# Patient Record
Sex: FEMALE | Race: WHITE | Employment: OTHER | URBAN - NONMETROPOLITAN AREA
[De-identification: names, ages, dates, MRNs, and addresses within clinical notes are randomized per-mention and may not be internally consistent; named-entity substitution may affect disease eponyms.]

---

## 2017-01-04 ENCOUNTER — TELEPHONE (OUTPATIENT)
Dept: CARDIOLOGY | Age: 67
End: 2017-01-04

## 2017-06-06 ENCOUNTER — TELEPHONE (OUTPATIENT)
Dept: CARDIOLOGY | Age: 67
End: 2017-06-06

## 2017-06-27 ENCOUNTER — OFFICE VISIT (OUTPATIENT)
Dept: CARDIOLOGY | Age: 67
End: 2017-06-27

## 2017-06-27 VITALS
WEIGHT: 161 LBS | DIASTOLIC BLOOD PRESSURE: 70 MMHG | HEIGHT: 63 IN | SYSTOLIC BLOOD PRESSURE: 130 MMHG | BODY MASS INDEX: 28.53 KG/M2 | HEART RATE: 65 BPM

## 2017-06-27 DIAGNOSIS — Z01.818 PRE-OP EVALUATION: Primary | ICD-10-CM

## 2017-06-27 DIAGNOSIS — R00.2 INTERMITTENT PALPITATIONS: ICD-10-CM

## 2017-06-27 DIAGNOSIS — I10 ESSENTIAL HYPERTENSION: ICD-10-CM

## 2017-06-27 PROCEDURE — 3014F SCREEN MAMMO DOC REV: CPT | Performed by: INTERNAL MEDICINE

## 2017-06-27 PROCEDURE — 3017F COLORECTAL CA SCREEN DOC REV: CPT | Performed by: INTERNAL MEDICINE

## 2017-06-27 PROCEDURE — G8400 PT W/DXA NO RESULTS DOC: HCPCS | Performed by: INTERNAL MEDICINE

## 2017-06-27 PROCEDURE — G8419 CALC BMI OUT NRM PARAM NOF/U: HCPCS | Performed by: INTERNAL MEDICINE

## 2017-06-27 PROCEDURE — G8428 CUR MEDS NOT DOCUMENT: HCPCS | Performed by: INTERNAL MEDICINE

## 2017-06-27 PROCEDURE — 1090F PRES/ABSN URINE INCON ASSESS: CPT | Performed by: INTERNAL MEDICINE

## 2017-06-27 PROCEDURE — 4040F PNEUMOC VAC/ADMIN/RCVD: CPT | Performed by: INTERNAL MEDICINE

## 2017-06-27 PROCEDURE — 1123F ACP DISCUSS/DSCN MKR DOCD: CPT | Performed by: INTERNAL MEDICINE

## 2017-06-27 PROCEDURE — 99213 OFFICE O/P EST LOW 20 MIN: CPT | Performed by: INTERNAL MEDICINE

## 2017-06-27 PROCEDURE — 93000 ELECTROCARDIOGRAM COMPLETE: CPT | Performed by: INTERNAL MEDICINE

## 2017-06-27 PROCEDURE — 1036F TOBACCO NON-USER: CPT | Performed by: INTERNAL MEDICINE

## 2017-07-18 ENCOUNTER — HOSPITAL ENCOUNTER (OUTPATIENT)
Dept: GENERAL RADIOLOGY | Age: 67
Discharge: HOME | End: 2017-07-18
Payer: MEDICARE

## 2017-07-18 ENCOUNTER — HOSPITAL ENCOUNTER (OUTPATIENT)
Age: 67
Discharge: HOME | End: 2017-07-18
Payer: MEDICARE

## 2017-07-18 DIAGNOSIS — Z01.811 PRE-OP CHEST EXAM: ICD-10-CM

## 2017-07-18 LAB
ANION GAP SERPL CALCULATED.3IONS-SCNC: 13 MEQ/L (ref 8–16)
APTT: 27 SECONDS (ref 22–38)
BACTERIA: ABNORMAL
BASOPHILS # BLD: 1.8 %
BASOPHILS ABSOLUTE: 0.1 THOU/MM3 (ref 0–0.1)
BILIRUBIN URINE: NEGATIVE
BLOOD, URINE: NEGATIVE
BUN BLDV-MCNC: 22 MG/DL (ref 7–22)
CASTS: ABNORMAL /LPF
CASTS: ABNORMAL /LPF
CHARACTER, URINE: CLEAR
CHLORIDE BLD-SCNC: 102 MEQ/L (ref 98–111)
CO2: 26 MEQ/L (ref 23–33)
COLOR: YELLOW
CREAT SERPL-MCNC: 1 MG/DL (ref 0.4–1.2)
CRYSTALS: ABNORMAL
EOSINOPHIL # BLD: 3.9 %
EOSINOPHILS ABSOLUTE: 0.2 THOU/MM3 (ref 0–0.4)
EPITHELIAL CELLS, UA: ABNORMAL /HPF
GFR SERPL CREATININE-BSD FRML MDRD: 55 ML/MIN/1.73M2
GLUCOSE BLD-MCNC: 91 MG/DL (ref 70–108)
GLUCOSE, URINE: NEGATIVE MG/DL
HCT VFR BLD CALC: 38.3 % (ref 37–47)
HEMOGLOBIN: 13 GM/DL (ref 12–16)
INR BLD: 0.91 (ref 0.85–1.13)
KETONES, URINE: NEGATIVE
LEUKOCYTE EST, POC: ABNORMAL
LYMPHOCYTES # BLD: 24.5 %
LYMPHOCYTES ABSOLUTE: 1.5 THOU/MM3 (ref 1–4.8)
MCH RBC QN AUTO: 33.4 PG (ref 27–31)
MCHC RBC AUTO-ENTMCNC: 33.8 GM/DL (ref 33–37)
MCV RBC AUTO: 98.6 FL (ref 81–99)
MISCELLANEOUS LAB TEST RESULT: ABNORMAL
MONOCYTES # BLD: 7.3 %
MONOCYTES ABSOLUTE: 0.5 THOU/MM3 (ref 0.4–1.3)
NITRITE, URINE: NEGATIVE
NUCLEATED RED BLOOD CELLS: 0 /100 WBC
PDW BLD-RTO: 13.3 % (ref 11.5–14.5)
PH UA: 6.5
PLATELET # BLD: 228 THOU/MM3 (ref 130–400)
PLATELET ESTIMATE: ADEQUATE
PMV BLD AUTO: 10.8 MCM (ref 7.4–10.4)
POTASSIUM SERPL-SCNC: 4.2 MEQ/L (ref 3.5–5.2)
PROTEIN UA: NEGATIVE MG/DL
RBC # BLD: 3.88 MILL/MM3 (ref 4.2–5.4)
RBC # BLD: NORMAL 10*6/UL
RBC URINE: ABNORMAL /HPF
RENAL EPITHELIAL, UA: ABNORMAL
SCAN OF BLOOD SMEAR: NORMAL
SEG NEUTROPHILS: 62.5 %
SEGMENTED NEUTROPHILS ABSOLUTE COUNT: 3.9 THOU/MM3 (ref 1.8–7.7)
SODIUM BLD-SCNC: 141 MEQ/L (ref 135–145)
SPECIFIC GRAVITY UA: 1.02 (ref 1–1.03)
UROBILINOGEN, URINE: 0.2 EU/DL
WBC # BLD: 6.2 THOU/MM3 (ref 4.8–10.8)
WBC UA: ABNORMAL /HPF
YEAST: ABNORMAL

## 2017-07-18 PROCEDURE — 82565 ASSAY OF CREATININE: CPT

## 2017-07-18 PROCEDURE — 81001 URINALYSIS AUTO W/SCOPE: CPT

## 2017-07-18 PROCEDURE — 85730 THROMBOPLASTIN TIME PARTIAL: CPT

## 2017-07-18 PROCEDURE — 71020 XR CHEST STANDARD TWO VW: CPT

## 2017-07-18 PROCEDURE — 85610 PROTHROMBIN TIME: CPT

## 2017-07-18 PROCEDURE — 84520 ASSAY OF UREA NITROGEN: CPT

## 2017-07-18 PROCEDURE — 36415 COLL VENOUS BLD VENIPUNCTURE: CPT

## 2017-07-18 PROCEDURE — 85025 COMPLETE CBC W/AUTO DIFF WBC: CPT

## 2017-07-18 PROCEDURE — 82947 ASSAY GLUCOSE BLOOD QUANT: CPT

## 2017-07-18 PROCEDURE — 80051 ELECTROLYTE PANEL: CPT

## 2017-08-12 ENCOUNTER — APPOINTMENT (OUTPATIENT)
Dept: CT IMAGING | Age: 67
End: 2017-08-12
Payer: MEDICARE

## 2017-08-12 ENCOUNTER — HOSPITAL ENCOUNTER (EMERGENCY)
Age: 67
Discharge: HOME | End: 2017-08-12
Attending: INTERNAL MEDICINE
Payer: MEDICARE

## 2017-08-12 VITALS
HEIGHT: 63 IN | RESPIRATION RATE: 14 BRPM | TEMPERATURE: 98.2 F | WEIGHT: 160 LBS | DIASTOLIC BLOOD PRESSURE: 66 MMHG | HEART RATE: 73 BPM | SYSTOLIC BLOOD PRESSURE: 145 MMHG | BODY MASS INDEX: 28.35 KG/M2 | OXYGEN SATURATION: 98 %

## 2017-08-12 DIAGNOSIS — R11.0 NAUSEA: Primary | ICD-10-CM

## 2017-08-12 LAB
ALBUMIN SERPL-MCNC: 3.5 G/DL (ref 3.5–5.1)
ALP BLD-CCNC: 92 U/L (ref 38–126)
ALT SERPL-CCNC: 12 U/L (ref 11–66)
AMYLASE: 95 U/L (ref 20–104)
ANION GAP SERPL CALCULATED.3IONS-SCNC: 15 MEQ/L (ref 8–16)
AST SERPL-CCNC: 14 U/L (ref 5–40)
BASOPHILS # BLD: 0.8 %
BASOPHILS ABSOLUTE: 0.1 THOU/MM3 (ref 0–0.1)
BILIRUB SERPL-MCNC: 0.2 MG/DL (ref 0.3–1.2)
BILIRUBIN DIRECT: < 0.2 MG/DL (ref 0–0.3)
BILIRUBIN URINE: NEGATIVE
BLOOD, URINE: NEGATIVE
BUN BLDV-MCNC: 16 MG/DL (ref 7–22)
CALCIUM SERPL-MCNC: 9.5 MG/DL (ref 8.5–10.5)
CHARACTER, URINE: CLEAR
CHLORIDE BLD-SCNC: 97 MEQ/L (ref 98–111)
CO2: 25 MEQ/L (ref 23–33)
COLOR: YELLOW
CREAT SERPL-MCNC: 0.8 MG/DL (ref 0.4–1.2)
EOSINOPHIL # BLD: 0.7 %
EOSINOPHILS ABSOLUTE: 0.1 THOU/MM3 (ref 0–0.4)
GFR SERPL CREATININE-BSD FRML MDRD: 71 ML/MIN/1.73M2
GLUCOSE BLD-MCNC: 146 MG/DL (ref 70–108)
GLUCOSE, URINE: NEGATIVE MG/DL
HCT VFR BLD CALC: 31.6 % (ref 37–47)
HEMOGLOBIN: 10.6 GM/DL (ref 12–16)
KETONES, URINE: NEGATIVE
LEUKOCYTE EST, POC: NEGATIVE
LIPASE: 39.8 U/L (ref 5.6–51.3)
LYMPHOCYTES # BLD: 16.8 %
LYMPHOCYTES ABSOLUTE: 1.4 THOU/MM3 (ref 1–4.8)
MAGNESIUM: 1.8 MG/DL (ref 1.6–2.4)
MCH RBC QN AUTO: 31.8 PG (ref 27–31)
MCHC RBC AUTO-ENTMCNC: 33.7 GM/DL (ref 33–37)
MCV RBC AUTO: 94.5 FL (ref 81–99)
MONOCYTES # BLD: 4.8 %
MONOCYTES ABSOLUTE: 0.4 THOU/MM3 (ref 0.4–1.3)
NITRITE, URINE: NEGATIVE
NUCLEATED RED BLOOD CELLS: 0 /100 WBC
OSMOLALITY CALCULATION: 277.6 MOSMOL/KG (ref 275–300)
PDW BLD-RTO: 12.7 % (ref 11.5–14.5)
PH UA: 7
PLATELET # BLD: 531 THOU/MM3 (ref 130–400)
PMV BLD AUTO: 8.5 MCM (ref 7.4–10.4)
POTASSIUM SERPL-SCNC: 3.8 MEQ/L (ref 3.5–5.2)
PROTEIN UA: NEGATIVE MG/DL
RBC # BLD: 3.35 MILL/MM3 (ref 4.2–5.4)
RBC # BLD: NORMAL 10*6/UL
SEG NEUTROPHILS: 76.9 %
SEGMENTED NEUTROPHILS ABSOLUTE COUNT: 6.5 THOU/MM3 (ref 1.8–7.7)
SODIUM BLD-SCNC: 137 MEQ/L (ref 135–145)
SPECIFIC GRAVITY UA: 1.01 (ref 1–1.03)
TOTAL PROTEIN: 6.7 G/DL (ref 6.1–8)
UROBILINOGEN, URINE: 0.2 EU/DL
WBC # BLD: 8.5 THOU/MM3 (ref 4.8–10.8)

## 2017-08-12 PROCEDURE — 87086 URINE CULTURE/COLONY COUNT: CPT

## 2017-08-12 PROCEDURE — 2580000003 HC RX 258: Performed by: INTERNAL MEDICINE

## 2017-08-12 PROCEDURE — 36415 COLL VENOUS BLD VENIPUNCTURE: CPT

## 2017-08-12 PROCEDURE — 80053 COMPREHEN METABOLIC PANEL: CPT

## 2017-08-12 PROCEDURE — 81003 URINALYSIS AUTO W/O SCOPE: CPT

## 2017-08-12 PROCEDURE — 83690 ASSAY OF LIPASE: CPT

## 2017-08-12 PROCEDURE — 82248 BILIRUBIN DIRECT: CPT

## 2017-08-12 PROCEDURE — 96372 THER/PROPH/DIAG INJ SC/IM: CPT

## 2017-08-12 PROCEDURE — 82150 ASSAY OF AMYLASE: CPT

## 2017-08-12 PROCEDURE — 85025 COMPLETE CBC W/AUTO DIFF WBC: CPT

## 2017-08-12 PROCEDURE — 96360 HYDRATION IV INFUSION INIT: CPT

## 2017-08-12 PROCEDURE — 83735 ASSAY OF MAGNESIUM: CPT

## 2017-08-12 PROCEDURE — 74176 CT ABD & PELVIS W/O CONTRAST: CPT

## 2017-08-12 PROCEDURE — 99284 EMERGENCY DEPT VISIT MOD MDM: CPT

## 2017-08-12 PROCEDURE — 6360000002 HC RX W HCPCS: Performed by: INTERNAL MEDICINE

## 2017-08-12 RX ORDER — 0.9 % SODIUM CHLORIDE 0.9 %
1000 INTRAVENOUS SOLUTION INTRAVENOUS ONCE
Status: COMPLETED | OUTPATIENT
Start: 2017-08-12 | End: 2017-08-12

## 2017-08-12 RX ORDER — ONDANSETRON 2 MG/ML
4 INJECTION INTRAMUSCULAR; INTRAVENOUS ONCE
Status: DISCONTINUED | OUTPATIENT
Start: 2017-08-12 | End: 2017-08-12 | Stop reason: HOSPADM

## 2017-08-12 RX ADMIN — TRIMETHOBENZAMIDE HYDROCHLORIDE 200 MG: 100 INJECTION INTRAMUSCULAR at 16:13

## 2017-08-12 RX ADMIN — SODIUM CHLORIDE 1000 ML: 9 INJECTION, SOLUTION INTRAVENOUS at 13:56

## 2017-08-12 ASSESSMENT — ENCOUNTER SYMPTOMS
ABDOMINAL PAIN: 0
COUGH: 0
BACK PAIN: 1
NAUSEA: 1
SHORTNESS OF BREATH: 0
EYE PAIN: 0
RHINORRHEA: 0
SORE THROAT: 0
DIARRHEA: 1
WHEEZING: 0
VOMITING: 1
EYE DISCHARGE: 0

## 2017-08-12 ASSESSMENT — PAIN DESCRIPTION - LOCATION: LOCATION: BACK

## 2017-08-12 ASSESSMENT — PAIN DESCRIPTION - DESCRIPTORS: DESCRIPTORS: ACHING

## 2017-08-12 ASSESSMENT — PAIN SCALES - WONG BAKER: WONGBAKER_NUMERICALRESPONSE: 2

## 2017-08-12 ASSESSMENT — PAIN DESCRIPTION - PAIN TYPE: TYPE: ACUTE PAIN

## 2017-08-14 LAB — URINE CULTURE, ROUTINE: NORMAL

## 2017-10-16 ENCOUNTER — HOSPITAL ENCOUNTER (OUTPATIENT)
Age: 67
Discharge: HOME | End: 2017-10-16
Payer: MEDICARE

## 2017-10-16 LAB
ALBUMIN SERPL-MCNC: 3.8 G/DL (ref 3.5–5.1)
ALT SERPL-CCNC: 16 U/L (ref 11–66)
BASOPHILS # BLD: 1 %
BASOPHILS ABSOLUTE: 0.1 THOU/MM3 (ref 0–0.1)
CREAT SERPL-MCNC: 0.9 MG/DL (ref 0.4–1.2)
EOSINOPHIL # BLD: 2.6 %
EOSINOPHILS ABSOLUTE: 0.2 THOU/MM3 (ref 0–0.4)
GFR SERPL CREATININE-BSD FRML MDRD: 62 ML/MIN/1.73M2
HCT VFR BLD CALC: 40.6 % (ref 37–47)
HEMOGLOBIN: 13.8 GM/DL (ref 12–16)
LYMPHOCYTES # BLD: 24.2 %
LYMPHOCYTES ABSOLUTE: 1.4 THOU/MM3 (ref 1–4.8)
MCH RBC QN AUTO: 31.6 PG (ref 27–31)
MCHC RBC AUTO-ENTMCNC: 34.1 GM/DL (ref 33–37)
MCV RBC AUTO: 92.6 FL (ref 81–99)
MONOCYTES # BLD: 6.4 %
MONOCYTES ABSOLUTE: 0.4 THOU/MM3 (ref 0.4–1.3)
NUCLEATED RED BLOOD CELLS: 0 /100 WBC
PDW BLD-RTO: 13.5 % (ref 11.5–14.5)
PLATELET # BLD: 271 THOU/MM3 (ref 130–400)
PMV BLD AUTO: 9 MCM (ref 7.4–10.4)
RBC # BLD: 4.38 MILL/MM3 (ref 4.2–5.4)
RBC # BLD: NORMAL 10*6/UL
SEDIMENTATION RATE, ERYTHROCYTE: 12 MM/HR (ref 0–20)
SEG NEUTROPHILS: 65.8 %
SEGMENTED NEUTROPHILS ABSOLUTE COUNT: 3.9 THOU/MM3 (ref 1.8–7.7)
WBC # BLD: 5.9 THOU/MM3 (ref 4.8–10.8)

## 2017-10-16 PROCEDURE — 84460 ALANINE AMINO (ALT) (SGPT): CPT

## 2017-10-16 PROCEDURE — 82565 ASSAY OF CREATININE: CPT

## 2017-10-16 PROCEDURE — 85651 RBC SED RATE NONAUTOMATED: CPT

## 2017-10-16 PROCEDURE — 82040 ASSAY OF SERUM ALBUMIN: CPT

## 2017-10-16 PROCEDURE — 36415 COLL VENOUS BLD VENIPUNCTURE: CPT

## 2017-10-16 PROCEDURE — 85025 COMPLETE CBC W/AUTO DIFF WBC: CPT

## 2018-02-14 ENCOUNTER — HOSPITAL ENCOUNTER (OUTPATIENT)
Age: 68
Discharge: HOME | End: 2018-02-14
Payer: MEDICARE

## 2018-02-14 LAB
ALBUMIN SERPL-MCNC: 4.1 G/DL (ref 3.5–5.1)
ALT SERPL-CCNC: 16 U/L (ref 11–66)
ANISOCYTOSIS: ABNORMAL
BASOPHILS # BLD: 0.6 %
BASOPHILS ABSOLUTE: 0 THOU/MM3 (ref 0–0.1)
CREAT SERPL-MCNC: 0.8 MG/DL (ref 0.4–1.2)
EOSINOPHIL # BLD: 3 %
EOSINOPHILS ABSOLUTE: 0.2 THOU/MM3 (ref 0–0.4)
GFR SERPL CREATININE-BSD FRML MDRD: 71 ML/MIN/1.73M2
HCT VFR BLD CALC: 38.3 % (ref 37–47)
HEMOGLOBIN: 12.8 GM/DL (ref 12–16)
LYMPHOCYTES # BLD: 24.5 %
LYMPHOCYTES ABSOLUTE: 1.3 THOU/MM3 (ref 1–4.8)
MCH RBC QN AUTO: 32.7 PG (ref 27–31)
MCHC RBC AUTO-ENTMCNC: 33.3 GM/DL (ref 33–37)
MCV RBC AUTO: 98.1 FL (ref 81–99)
MONOCYTES # BLD: 10.2 %
MONOCYTES ABSOLUTE: 0.5 THOU/MM3 (ref 0.4–1.3)
NUCLEATED RED BLOOD CELLS: 0 /100 WBC
PDW BLD-RTO: 14.8 % (ref 11.5–14.5)
PLATELET # BLD: 264 THOU/MM3 (ref 130–400)
PMV BLD AUTO: 9.2 FL (ref 7.4–10.4)
RBC # BLD: 3.9 MILL/MM3 (ref 4.2–5.4)
SEDIMENTATION RATE, ERYTHROCYTE: 11 MM/HR (ref 0–20)
SEG NEUTROPHILS: 61.7 %
SEGMENTED NEUTROPHILS ABSOLUTE COUNT: 3.2 THOU/MM3 (ref 1.8–7.7)
WBC # BLD: 5.2 THOU/MM3 (ref 4.8–10.8)

## 2018-02-14 PROCEDURE — 82565 ASSAY OF CREATININE: CPT

## 2018-02-14 PROCEDURE — 85025 COMPLETE CBC W/AUTO DIFF WBC: CPT

## 2018-02-14 PROCEDURE — 85651 RBC SED RATE NONAUTOMATED: CPT

## 2018-02-14 PROCEDURE — 36415 COLL VENOUS BLD VENIPUNCTURE: CPT

## 2018-02-14 PROCEDURE — 84460 ALANINE AMINO (ALT) (SGPT): CPT

## 2018-02-14 PROCEDURE — 82040 ASSAY OF SERUM ALBUMIN: CPT

## 2018-04-16 ENCOUNTER — HOSPITAL ENCOUNTER (OUTPATIENT)
Dept: MRI IMAGING | Age: 68
Discharge: HOME | End: 2018-04-16
Payer: MEDICARE

## 2018-04-16 DIAGNOSIS — M48.062 LUMBAR STENOSIS WITH NEUROGENIC CLAUDICATION: ICD-10-CM

## 2018-04-16 DIAGNOSIS — M96.0 PSEUDARTHROSIS FOLLOWING SPINAL FUSION: ICD-10-CM

## 2018-04-16 PROCEDURE — 73221 MRI JOINT UPR EXTREM W/O DYE: CPT

## 2018-06-02 ENCOUNTER — HOSPITAL ENCOUNTER (OUTPATIENT)
Age: 68
Discharge: HOME | End: 2018-06-02
Payer: MEDICARE

## 2018-06-02 LAB
ANISOCYTOSIS: ABNORMAL
BACTERIA: ABNORMAL /HPF
BASOPHILS # BLD: 0.8 %
BASOPHILS ABSOLUTE: 0.1 THOU/MM3 (ref 0–0.1)
BILIRUBIN URINE: NEGATIVE
BLOOD, URINE: NEGATIVE
CASTS 2: ABNORMAL /LPF
CASTS UA: ABNORMAL /LPF
CHARACTER, URINE: CLEAR
COLOR: YELLOW
CRYSTALS, UA: ABNORMAL
EOSINOPHIL # BLD: 3.5 %
EOSINOPHILS ABSOLUTE: 0.3 THOU/MM3 (ref 0–0.4)
EPITHELIAL CELLS, UA: ABNORMAL /HPF
GLUCOSE URINE: NEGATIVE MG/DL
HCT VFR BLD CALC: 40.4 % (ref 37–47)
HEMOGLOBIN: 13.6 GM/DL (ref 12–16)
KETONES, URINE: NEGATIVE
LEUKOCYTE ESTERASE, URINE: ABNORMAL
LYMPHOCYTES # BLD: 31.4 %
LYMPHOCYTES ABSOLUTE: 2.3 THOU/MM3 (ref 1–4.8)
MCH RBC QN AUTO: 32.8 PG (ref 27–31)
MCHC RBC AUTO-ENTMCNC: 33.8 GM/DL (ref 33–37)
MCV RBC AUTO: 97.1 FL (ref 81–99)
MISCELLANEOUS 2: ABNORMAL
MONOCYTES # BLD: 6.2 %
MONOCYTES ABSOLUTE: 0.5 THOU/MM3 (ref 0.4–1.3)
NITRITE, URINE: NEGATIVE
NUCLEATED RED BLOOD CELLS: 0 /100 WBC
PDW BLD-RTO: 14.7 % (ref 11.5–14.5)
PH UA: 6.5
PLATELET # BLD: 307 THOU/MM3 (ref 130–400)
PMV BLD AUTO: 10.5 FL (ref 7.4–10.4)
PROTEIN UA: NEGATIVE
RBC # BLD: 4.16 MILL/MM3 (ref 4.2–5.4)
RBC URINE: ABNORMAL /HPF
RENAL EPITHELIAL, UA: ABNORMAL
SEG NEUTROPHILS: 58.1 %
SEGMENTED NEUTROPHILS ABSOLUTE COUNT: 4.3 THOU/MM3 (ref 1.8–7.7)
SPECIFIC GRAVITY, URINE: 1.01 (ref 1–1.03)
UROBILINOGEN, URINE: 0.2 EU/DL
WBC # BLD: 7.4 THOU/MM3 (ref 4.8–10.8)
WBC UA: ABNORMAL /HPF
YEAST: ABNORMAL

## 2018-06-02 PROCEDURE — 36415 COLL VENOUS BLD VENIPUNCTURE: CPT

## 2018-06-02 PROCEDURE — 81001 URINALYSIS AUTO W/SCOPE: CPT

## 2018-06-02 PROCEDURE — 85025 COMPLETE CBC W/AUTO DIFF WBC: CPT

## 2018-06-02 PROCEDURE — 87086 URINE CULTURE/COLONY COUNT: CPT

## 2018-06-04 ENCOUNTER — OFFICE VISIT (OUTPATIENT)
Dept: CARDIOLOGY CLINIC | Age: 68
End: 2018-06-04
Payer: MEDICARE

## 2018-06-04 VITALS
BODY MASS INDEX: 28.95 KG/M2 | HEIGHT: 63 IN | HEART RATE: 60 BPM | WEIGHT: 163.38 LBS | DIASTOLIC BLOOD PRESSURE: 82 MMHG | SYSTOLIC BLOOD PRESSURE: 148 MMHG

## 2018-06-04 DIAGNOSIS — Z82.49 FAMILY HISTORY OF PREMATURE CAD: ICD-10-CM

## 2018-06-04 DIAGNOSIS — Z01.818 PRE-OP EVALUATION: Primary | ICD-10-CM

## 2018-06-04 DIAGNOSIS — Z72.0 TOBACCO ABUSE: ICD-10-CM

## 2018-06-04 DIAGNOSIS — I10 ESSENTIAL HYPERTENSION: ICD-10-CM

## 2018-06-04 DIAGNOSIS — R00.2 INTERMITTENT PALPITATIONS: ICD-10-CM

## 2018-06-04 DIAGNOSIS — R94.31 ABNORMAL EKG: ICD-10-CM

## 2018-06-04 LAB — URINE CULTURE REFLEX: NORMAL

## 2018-06-04 PROCEDURE — G8427 DOCREV CUR MEDS BY ELIG CLIN: HCPCS | Performed by: INTERNAL MEDICINE

## 2018-06-04 PROCEDURE — 4040F PNEUMOC VAC/ADMIN/RCVD: CPT | Performed by: INTERNAL MEDICINE

## 2018-06-04 PROCEDURE — G8400 PT W/DXA NO RESULTS DOC: HCPCS | Performed by: INTERNAL MEDICINE

## 2018-06-04 PROCEDURE — 3017F COLORECTAL CA SCREEN DOC REV: CPT | Performed by: INTERNAL MEDICINE

## 2018-06-04 PROCEDURE — 1036F TOBACCO NON-USER: CPT | Performed by: INTERNAL MEDICINE

## 2018-06-04 PROCEDURE — 1090F PRES/ABSN URINE INCON ASSESS: CPT | Performed by: INTERNAL MEDICINE

## 2018-06-04 PROCEDURE — G8417 CALC BMI ABV UP PARAM F/U: HCPCS | Performed by: INTERNAL MEDICINE

## 2018-06-04 PROCEDURE — 99214 OFFICE O/P EST MOD 30 MIN: CPT | Performed by: INTERNAL MEDICINE

## 2018-06-04 PROCEDURE — 1123F ACP DISCUSS/DSCN MKR DOCD: CPT | Performed by: INTERNAL MEDICINE

## 2018-06-04 RX ORDER — DESVENLAFAXINE 100 MG/1
TABLET, EXTENDED RELEASE ORAL DAILY
Status: ON HOLD | COMMUNITY
End: 2021-10-22

## 2018-06-04 RX ORDER — AMOXICILLIN AND CLAVULANATE POTASSIUM 875; 125 MG/1; MG/1
1 TABLET, FILM COATED ORAL EVERY 12 HOURS
COMMUNITY
End: 2019-01-21

## 2018-06-06 ENCOUNTER — TELEPHONE (OUTPATIENT)
Dept: CARDIOLOGY CLINIC | Age: 68
End: 2018-06-06

## 2018-06-11 ENCOUNTER — HOSPITAL ENCOUNTER (OUTPATIENT)
Age: 68
Discharge: HOME | End: 2018-06-11
Payer: MEDICARE

## 2018-06-11 LAB
ALBUMIN SERPL-MCNC: 4 G/DL (ref 3.5–5.1)
ALT SERPL-CCNC: 17 U/L (ref 11–66)
BASOPHILS # BLD: 0.7 %
BASOPHILS ABSOLUTE: 0.1 THOU/MM3 (ref 0–0.1)
CREAT SERPL-MCNC: 1.1 MG/DL (ref 0.4–1.2)
EOSINOPHIL # BLD: 2.6 %
EOSINOPHILS ABSOLUTE: 0.2 THOU/MM3 (ref 0–0.4)
GFR SERPL CREATININE-BSD FRML MDRD: 49 ML/MIN/1.73M2
HCT VFR BLD CALC: 38.2 % (ref 37–47)
HEMOGLOBIN: 12.8 GM/DL (ref 12–16)
LYMPHOCYTES # BLD: 21.7 %
LYMPHOCYTES ABSOLUTE: 1.7 THOU/MM3 (ref 1–4.8)
MCH RBC QN AUTO: 32.5 PG (ref 27–31)
MCHC RBC AUTO-ENTMCNC: 33.6 GM/DL (ref 33–37)
MCV RBC AUTO: 96.7 FL (ref 81–99)
MONOCYTES # BLD: 6.4 %
MONOCYTES ABSOLUTE: 0.5 THOU/MM3 (ref 0.4–1.3)
NUCLEATED RED BLOOD CELLS: 0 /100 WBC
PDW BLD-RTO: 14.1 % (ref 11.5–14.5)
PLATELET # BLD: 268 THOU/MM3 (ref 130–400)
PMV BLD AUTO: 9.1 FL (ref 7.4–10.4)
RBC # BLD: 3.95 MILL/MM3 (ref 4.2–5.4)
SEDIMENTATION RATE, ERYTHROCYTE: 13 MM/HR (ref 0–20)
SEG NEUTROPHILS: 68.6 %
SEGMENTED NEUTROPHILS ABSOLUTE COUNT: 5.4 THOU/MM3 (ref 1.8–7.7)
WBC # BLD: 7.9 THOU/MM3 (ref 4.8–10.8)

## 2018-06-11 PROCEDURE — 84460 ALANINE AMINO (ALT) (SGPT): CPT

## 2018-06-11 PROCEDURE — 82040 ASSAY OF SERUM ALBUMIN: CPT

## 2018-06-11 PROCEDURE — 82565 ASSAY OF CREATININE: CPT

## 2018-06-11 PROCEDURE — 85025 COMPLETE CBC W/AUTO DIFF WBC: CPT

## 2018-06-11 PROCEDURE — 85651 RBC SED RATE NONAUTOMATED: CPT

## 2018-06-11 PROCEDURE — 36415 COLL VENOUS BLD VENIPUNCTURE: CPT

## 2018-06-21 ENCOUNTER — HOSPITAL ENCOUNTER (OUTPATIENT)
Dept: NON INVASIVE DIAGNOSTICS | Age: 68
Discharge: HOME | End: 2018-06-21
Payer: MEDICARE

## 2018-06-21 DIAGNOSIS — Z82.49 FAMILY HISTORY OF PREMATURE CAD: ICD-10-CM

## 2018-06-21 DIAGNOSIS — Z01.818 PRE-OP EVALUATION: ICD-10-CM

## 2018-06-21 DIAGNOSIS — Z72.0 TOBACCO ABUSE: ICD-10-CM

## 2018-06-21 DIAGNOSIS — R00.2 INTERMITTENT PALPITATIONS: ICD-10-CM

## 2018-06-21 DIAGNOSIS — R94.31 ABNORMAL EKG: ICD-10-CM

## 2018-06-21 DIAGNOSIS — I10 ESSENTIAL HYPERTENSION: ICD-10-CM

## 2018-06-21 LAB
LV EF: 65 %
LVEF MODALITY: NORMAL

## 2018-06-21 PROCEDURE — 78452 HT MUSCLE IMAGE SPECT MULT: CPT

## 2018-06-21 PROCEDURE — A9500 TC99M SESTAMIBI: HCPCS | Performed by: INTERNAL MEDICINE

## 2018-06-21 PROCEDURE — 3430000000 HC RX DIAGNOSTIC RADIOPHARMACEUTICAL: Performed by: INTERNAL MEDICINE

## 2018-06-21 PROCEDURE — 93306 TTE W/DOPPLER COMPLETE: CPT

## 2018-06-21 PROCEDURE — 6360000002 HC RX W HCPCS

## 2018-06-21 PROCEDURE — 93017 CV STRESS TEST TRACING ONLY: CPT | Performed by: INTERNAL MEDICINE

## 2018-06-21 RX ADMIN — Medication 9.5 MILLICURIE: at 14:10

## 2018-06-21 RX ADMIN — Medication 34.2 MILLICURIE: at 14:55

## 2018-07-04 PROBLEM — Z01.818 PRE-OP EVALUATION: Status: RESOLVED | Noted: 2018-06-04 | Resolved: 2018-07-04

## 2018-08-20 ENCOUNTER — HOSPITAL ENCOUNTER (OUTPATIENT)
Age: 68
Discharge: HOME | End: 2018-08-20
Payer: MEDICARE

## 2018-08-20 LAB
ALBUMIN SERPL-MCNC: 4.3 G/DL (ref 3.5–5.1)
ALT SERPL-CCNC: 30 U/L (ref 11–66)
BASOPHILS # BLD: 0.8 %
BASOPHILS ABSOLUTE: 0 THOU/MM3 (ref 0–0.1)
CREAT SERPL-MCNC: 1 MG/DL (ref 0.4–1.2)
EOSINOPHIL # BLD: 4.5 %
EOSINOPHILS ABSOLUTE: 0.2 THOU/MM3 (ref 0–0.4)
ERYTHROCYTE [DISTWIDTH] IN BLOOD BY AUTOMATED COUNT: 13.6 % (ref 11.5–14.5)
ERYTHROCYTE [DISTWIDTH] IN BLOOD BY AUTOMATED COUNT: 46.9 FL (ref 35–45)
GFR SERPL CREATININE-BSD FRML MDRD: 55 ML/MIN/1.73M2
HCT VFR BLD CALC: 40.5 % (ref 37–47)
HEMOGLOBIN: 13.4 GM/DL (ref 12–16)
IMMATURE GRANS (ABS): 0.02 THOU/MM3 (ref 0–0.07)
IMMATURE GRANULOCYTES: 0.4 %
LYMPHOCYTES # BLD: 29.3 %
LYMPHOCYTES ABSOLUTE: 1.4 THOU/MM3 (ref 1–4.8)
MCH RBC QN AUTO: 32.5 PG (ref 26–33)
MCHC RBC AUTO-ENTMCNC: 33.1 GM/DL (ref 32.2–35.5)
MCV RBC AUTO: 98.3 FL (ref 81–99)
MONOCYTES # BLD: 9.6 %
MONOCYTES ABSOLUTE: 0.5 THOU/MM3 (ref 0.4–1.3)
NUCLEATED RED BLOOD CELLS: 0 /100 WBC
PLATELET # BLD: 244 THOU/MM3 (ref 130–400)
PMV BLD AUTO: 11 FL (ref 9.4–12.4)
RBC # BLD: 4.12 MILL/MM3 (ref 4.2–5.4)
SEDIMENTATION RATE, ERYTHROCYTE: 11 MM/HR (ref 0–20)
SEG NEUTROPHILS: 55.4 %
SEGMENTED NEUTROPHILS ABSOLUTE COUNT: 2.7 THOU/MM3 (ref 1.8–7.7)
VITAMIN D 25-HYDROXY: 45 NG/ML (ref 30–100)
WBC # BLD: 4.9 THOU/MM3 (ref 4.8–10.8)

## 2018-08-20 PROCEDURE — 82565 ASSAY OF CREATININE: CPT

## 2018-08-20 PROCEDURE — 36415 COLL VENOUS BLD VENIPUNCTURE: CPT

## 2018-08-20 PROCEDURE — 86160 COMPLEMENT ANTIGEN: CPT

## 2018-08-20 PROCEDURE — 85025 COMPLETE CBC W/AUTO DIFF WBC: CPT

## 2018-08-20 PROCEDURE — 82040 ASSAY OF SERUM ALBUMIN: CPT

## 2018-08-20 PROCEDURE — 84460 ALANINE AMINO (ALT) (SGPT): CPT

## 2018-08-20 PROCEDURE — 82306 VITAMIN D 25 HYDROXY: CPT

## 2018-08-20 PROCEDURE — 85651 RBC SED RATE NONAUTOMATED: CPT

## 2018-08-20 PROCEDURE — 86162 COMPLEMENT TOTAL (CH50): CPT

## 2018-08-22 LAB
C3 COMPLEMENT: 127 MG/DL (ref 88–201)
C4 COMPLEMENT: 25 MG/DL (ref 10–40)

## 2018-08-23 LAB — COMPLEMENT TOTAL (CH50): 96 CAE UNITS (ref 60–144)

## 2018-09-05 NOTE — TELEPHONE ENCOUNTER
Dearl Melissa called requesting a refill on the following medications:  Requested Prescriptions     Pending Prescriptions Disp Refills    metoprolol tartrate (LOPRESSOR) 25 MG tablet 90 tablet 1     Pharmacy verified:  .pv    PATIENT IS COMPLETELY OUT OF MEDICATION    Date of last visit: 6/4/18  Date of next visit (if applicable): 6/5/19

## 2018-10-01 ENCOUNTER — HOSPITAL ENCOUNTER (EMERGENCY)
Age: 68
Discharge: HOME | End: 2018-10-01
Payer: MEDICARE

## 2018-10-01 VITALS
HEIGHT: 63 IN | OXYGEN SATURATION: 97 % | WEIGHT: 163 LBS | SYSTOLIC BLOOD PRESSURE: 181 MMHG | RESPIRATION RATE: 16 BRPM | HEART RATE: 80 BPM | DIASTOLIC BLOOD PRESSURE: 103 MMHG | BODY MASS INDEX: 28.88 KG/M2
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
## 2018-10-01 PROCEDURE — 6370000000 HC RX 637 (ALT 250 FOR IP): Performed by: NURSE PRACTITIONER

## 2018-10-01 PROCEDURE — 99281 EMR DPT VST MAYX REQ PHY/QHP: CPT

## 2018-10-01 RX ORDER — FAMOTIDINE 20 MG/1
20 TABLET, FILM COATED ORAL ONCE
Status: COMPLETED | OUTPATIENT
Start: 2018-10-01 | End: 2018-10-01

## 2018-10-01 RX ORDER — CEPHALEXIN 500 MG/1
500 CAPSULE ORAL 3 TIMES DAILY
Qty: 21 CAPSULE | Refills: 0 | Status: SHIPPED | OUTPATIENT
Start: 2018-10-01 | End: 2018-10-08

## 2018-10-01 RX ADMIN — FAMOTIDINE 20 MG: 20 TABLET ORAL at 01:54

## 2018-10-01 ASSESSMENT — PAIN DESCRIPTION - LOCATION: LOCATION: ARM

## 2018-10-01 ASSESSMENT — ENCOUNTER SYMPTOMS
NAUSEA: 0
WHEEZING: 0
COUGH: 0
BACK PAIN: 0
EYE PAIN: 0
RHINORRHEA: 0
SHORTNESS OF BREATH: 0
SORE THROAT: 0
ABDOMINAL PAIN: 0
VOMITING: 0
DIARRHEA: 0
EYE DISCHARGE: 0

## 2018-10-01 ASSESSMENT — PAIN DESCRIPTION - ORIENTATION: ORIENTATION: RIGHT;INNER

## 2018-10-01 ASSESSMENT — PAIN DESCRIPTION - PROGRESSION: CLINICAL_PROGRESSION: GRADUALLY WORSENING

## 2018-10-01 ASSESSMENT — PAIN SCALES - GENERAL: PAINLEVEL_OUTOF10: 5

## 2018-10-01 ASSESSMENT — PAIN DESCRIPTION - PAIN TYPE: TYPE: ACUTE PAIN

## 2018-10-01 ASSESSMENT — PAIN DESCRIPTION - ONSET: ONSET: SUDDEN

## 2018-10-01 ASSESSMENT — PAIN DESCRIPTION - FREQUENCY: FREQUENCY: CONTINUOUS

## 2018-10-01 ASSESSMENT — PAIN DESCRIPTION - DESCRIPTORS: DESCRIPTORS: BURNING

## 2018-10-01 NOTE — ED TRIAGE NOTES
Pt reports to ED with CC of insect bite to the medial aspect of her right antecubital. Pt states she was at a cookout when she felt a bite on her arm. Pt did not see what bit her but she immediately got a headache and felt nauseas. Pt states the bite has gotten warmer and more puffed up. Pt states she wanted to come in and get it checked because its still burning. VS and assessment completed on arrival. Will continue to monitor.

## 2018-10-01 NOTE — ED PROVIDER NOTES
97%   Weight: 163 lb (73.9 kg)   Height: 5' 3\" (1.6 m)       1:40 AM: The patient was seen and evaluated. MDM:  The patient was seen and evaluated within the ED today for the evaluation of an insect bite. The patient presented in no acute distress. Physical exam revealed two small puncture lesions which presents with surrounding tenderness secondary to palpation, moderate warmth, minimal swelling, and a minimal amount of surrounding erythema to the right AC region. The patient was treated with pepcid while in the ED. I observed the patient's condition to remain stable during the duration of her stay. She was amenable to my decision for discharge and was sent home in stable condition with Keflex. I discussed return precautions and she verbalized understanding. I recommended that she f/u with her pcp in 3-5 days for further evaluation and work up if their symptoms do not improve. All questions and concerns were addressed. CRITICAL CARE:   None     CONSULTS:  None    PROCEDURES:  None    FINAL IMPRESSION      1. Insect bite, initial encounter          DISPOSITION/PLAN   Discharged    PATIENT REFERRED TO:  Ike Villela MD  [address] [place] [address]  [phone]    In 2 days        DISCHARGE MEDICATIONS:  Discharge Medication List as of 10/1/2018  1:43 AM      START taking these medications    Details   cephALEXin (KEFLEX) 500 MG capsule Take 1 capsule by mouth 3 times daily for 7 days, Disp-21 capsule, R-0Print             (Please note that portions of this note were completed with a voice recognition program.  Efforts were made to edit the dictations but occasionally words are mis-transcribed.)    The patient was given an opportunity to see the Emergency Attending. The patient voiced understanding that I was a Mid-Level Provider and was in agreement with being seen independently by myself.      Scribe:  Andrzej Vasquez 10/1/18 1:40 AM Scribing for and in the presence of Jorge Thomas

## 2018-10-12 ENCOUNTER — HOSPITAL ENCOUNTER (OUTPATIENT)
Dept: MRI IMAGING | Age: 68
Discharge: HOME | End: 2018-10-12
Payer: MEDICARE

## 2018-10-12 DIAGNOSIS — M54.5 LOW BACK PAIN, UNSPECIFIED BACK PAIN LATERALITY, UNSPECIFIED CHRONICITY, WITH SCIATICA PRESENCE UNSPECIFIED: ICD-10-CM

## 2018-10-12 DIAGNOSIS — Z87.39 H/O DEGENERATIVE DISC DISEASE: ICD-10-CM

## 2018-10-12 PROCEDURE — 72148 MRI LUMBAR SPINE W/O DYE: CPT

## 2019-01-07 ENCOUNTER — HOSPITAL ENCOUNTER (OUTPATIENT)
Age: 69
Discharge: HOME | End: 2019-01-07
Payer: MEDICARE

## 2019-01-07 LAB
ALBUMIN SERPL-MCNC: 4.2 G/DL (ref 3.5–5.1)
ALT SERPL-CCNC: 22 U/L (ref 11–66)
ANION GAP SERPL CALCULATED.3IONS-SCNC: 10 MEQ/L (ref 8–16)
APTT: 30.7 SECONDS (ref 22–38)
BASOPHILS # BLD: 1 %
BASOPHILS ABSOLUTE: 0.1 THOU/MM3 (ref 0–0.1)
BUN BLDV-MCNC: 35 MG/DL (ref 7–22)
CHLORIDE BLD-SCNC: 99 MEQ/L (ref 98–111)
CO2: 25 MEQ/L (ref 23–33)
CREAT SERPL-MCNC: 0.9 MG/DL (ref 0.4–1.2)
EOSINOPHIL # BLD: 2.3 %
EOSINOPHILS ABSOLUTE: 0.1 THOU/MM3 (ref 0–0.4)
ERYTHROCYTE [DISTWIDTH] IN BLOOD BY AUTOMATED COUNT: 12.7 % (ref 11.5–14.5)
ERYTHROCYTE [DISTWIDTH] IN BLOOD BY AUTOMATED COUNT: 44.7 FL (ref 35–45)
GFR SERPL CREATININE-BSD FRML MDRD: 62 ML/MIN/1.73M2
GLUCOSE BLD-MCNC: 100 MG/DL (ref 70–108)
HCT VFR BLD CALC: 40.4 % (ref 37–47)
HEMOGLOBIN: 13.4 GM/DL (ref 12–16)
IMMATURE GRANS (ABS): 0.01 THOU/MM3 (ref 0–0.07)
IMMATURE GRANULOCYTES: 0.2 %
INR BLD: 0.85 (ref 0.85–1.13)
LYMPHOCYTES # BLD: 25.4 %
LYMPHOCYTES ABSOLUTE: 1.6 THOU/MM3 (ref 1–4.8)
MCH RBC QN AUTO: 32.1 PG (ref 26–33)
MCHC RBC AUTO-ENTMCNC: 33.2 GM/DL (ref 32.2–35.5)
MCV RBC AUTO: 96.9 FL (ref 81–99)
MONOCYTES # BLD: 7.9 %
MONOCYTES ABSOLUTE: 0.5 THOU/MM3 (ref 0.4–1.3)
NUCLEATED RED BLOOD CELLS: 0 /100 WBC
PLATELET # BLD: 282 THOU/MM3 (ref 130–400)
PMV BLD AUTO: 10.7 FL (ref 9.4–12.4)
POTASSIUM SERPL-SCNC: 4.8 MEQ/L (ref 3.5–5.2)
RBC # BLD: 4.17 MILL/MM3 (ref 4.2–5.4)
SEDIMENTATION RATE, ERYTHROCYTE: 6 MM/HR (ref 0–20)
SEG NEUTROPHILS: 63.2 %
SEGMENTED NEUTROPHILS ABSOLUTE COUNT: 3.9 THOU/MM3 (ref 1.8–7.7)
SODIUM BLD-SCNC: 134 MEQ/L (ref 135–145)
WBC # BLD: 6.2 THOU/MM3 (ref 4.8–10.8)

## 2019-01-07 PROCEDURE — 84520 ASSAY OF UREA NITROGEN: CPT

## 2019-01-07 PROCEDURE — 82565 ASSAY OF CREATININE: CPT

## 2019-01-07 PROCEDURE — 36415 COLL VENOUS BLD VENIPUNCTURE: CPT

## 2019-01-07 PROCEDURE — 85730 THROMBOPLASTIN TIME PARTIAL: CPT

## 2019-01-07 PROCEDURE — 85025 COMPLETE CBC W/AUTO DIFF WBC: CPT

## 2019-01-07 PROCEDURE — 85651 RBC SED RATE NONAUTOMATED: CPT

## 2019-01-07 PROCEDURE — 84460 ALANINE AMINO (ALT) (SGPT): CPT

## 2019-01-07 PROCEDURE — 82040 ASSAY OF SERUM ALBUMIN: CPT

## 2019-01-07 PROCEDURE — 80051 ELECTROLYTE PANEL: CPT

## 2019-01-07 PROCEDURE — 82947 ASSAY GLUCOSE BLOOD QUANT: CPT

## 2019-01-07 PROCEDURE — 85610 PROTHROMBIN TIME: CPT

## 2019-01-16 ENCOUNTER — TELEPHONE (OUTPATIENT)
Dept: CARDIOLOGY CLINIC | Age: 69
End: 2019-01-16

## 2019-01-21 ENCOUNTER — OFFICE VISIT (OUTPATIENT)
Dept: CARDIOLOGY CLINIC | Age: 69
End: 2019-01-21
Payer: MEDICARE

## 2019-01-21 VITALS
HEART RATE: 64 BPM | SYSTOLIC BLOOD PRESSURE: 158 MMHG | WEIGHT: 170 LBS | DIASTOLIC BLOOD PRESSURE: 80 MMHG | BODY MASS INDEX: 30.11 KG/M2

## 2019-01-21 DIAGNOSIS — Z01.810 PREOP CARDIOVASCULAR EXAM: Primary | ICD-10-CM

## 2019-01-21 PROCEDURE — 1123F ACP DISCUSS/DSCN MKR DOCD: CPT | Performed by: INTERNAL MEDICINE

## 2019-01-21 PROCEDURE — G8427 DOCREV CUR MEDS BY ELIG CLIN: HCPCS | Performed by: INTERNAL MEDICINE

## 2019-01-21 PROCEDURE — G8417 CALC BMI ABV UP PARAM F/U: HCPCS | Performed by: INTERNAL MEDICINE

## 2019-01-21 PROCEDURE — 1036F TOBACCO NON-USER: CPT | Performed by: INTERNAL MEDICINE

## 2019-01-21 PROCEDURE — G8400 PT W/DXA NO RESULTS DOC: HCPCS | Performed by: INTERNAL MEDICINE

## 2019-01-21 PROCEDURE — 1090F PRES/ABSN URINE INCON ASSESS: CPT | Performed by: INTERNAL MEDICINE

## 2019-01-21 PROCEDURE — 93000 ELECTROCARDIOGRAM COMPLETE: CPT | Performed by: INTERNAL MEDICINE

## 2019-01-21 PROCEDURE — 1101F PT FALLS ASSESS-DOCD LE1/YR: CPT | Performed by: INTERNAL MEDICINE

## 2019-01-21 PROCEDURE — 4040F PNEUMOC VAC/ADMIN/RCVD: CPT | Performed by: INTERNAL MEDICINE

## 2019-01-21 PROCEDURE — 3017F COLORECTAL CA SCREEN DOC REV: CPT | Performed by: INTERNAL MEDICINE

## 2019-01-21 PROCEDURE — G8484 FLU IMMUNIZE NO ADMIN: HCPCS | Performed by: INTERNAL MEDICINE

## 2019-01-21 PROCEDURE — 99213 OFFICE O/P EST LOW 20 MIN: CPT | Performed by: INTERNAL MEDICINE

## 2019-01-21 RX ORDER — FLUTICASONE PROPIONATE 0.05 %
CREAM (GRAM) TOPICAL 2 TIMES DAILY PRN
COMMUNITY

## 2019-01-21 RX ORDER — LISINOPRIL 20 MG/1
30 TABLET ORAL DAILY
Qty: 90 TABLET | Refills: 3 | Status: SHIPPED | OUTPATIENT
Start: 2019-01-21 | End: 2020-06-03 | Stop reason: SDUPTHER

## 2019-01-21 RX ORDER — CLOBETASOL PROPIONATE 0.5 MG/G
CREAM TOPICAL DAILY PRN
COMMUNITY

## 2019-01-21 ASSESSMENT — ENCOUNTER SYMPTOMS
SHORTNESS OF BREATH: 1
HOARSE VOICE: 0
EYE PAIN: 0
EYE DISCHARGE: 0
HEMOPTYSIS: 0
BLOATING: 0
BACK PAIN: 0
DIARRHEA: 0
ABDOMINAL PAIN: 0
BLURRED VISION: 0
DOUBLE VISION: 0
CONSTIPATION: 0
COUGH: 0
ORTHOPNEA: 0
SPUTUM PRODUCTION: 0
BOWEL INCONTINENCE: 0
CHANGE IN BOWEL HABIT: 0

## 2019-04-16 ENCOUNTER — NURSE ONLY (OUTPATIENT)
Dept: LAB | Age: 69
End: 2019-04-16

## 2019-04-16 LAB
ALBUMIN SERPL-MCNC: 3.9 G/DL (ref 3.5–5.1)
ALT SERPL-CCNC: 16 U/L (ref 11–66)
BASOPHILS # BLD: 0.9 %
BASOPHILS ABSOLUTE: 0 THOU/MM3 (ref 0–0.1)
CREAT SERPL-MCNC: 1 MG/DL (ref 0.4–1.2)
EOSINOPHIL # BLD: 3.8 %
EOSINOPHILS ABSOLUTE: 0.2 THOU/MM3 (ref 0–0.4)
ERYTHROCYTE [DISTWIDTH] IN BLOOD BY AUTOMATED COUNT: 13.6 % (ref 11.5–14.5)
ERYTHROCYTE [DISTWIDTH] IN BLOOD BY AUTOMATED COUNT: 47.9 FL (ref 35–45)
GFR SERPL CREATININE-BSD FRML MDRD: 55 ML/MIN/1.73M2
HCT VFR BLD CALC: 38.6 % (ref 37–47)
HEMOGLOBIN: 12.7 GM/DL (ref 12–16)
IMMATURE GRANS (ABS): 0.01 THOU/MM3 (ref 0–0.07)
IMMATURE GRANULOCYTES: 0.2 %
LYMPHOCYTES # BLD: 32.4 %
LYMPHOCYTES ABSOLUTE: 1.5 THOU/MM3 (ref 1–4.8)
MCH RBC QN AUTO: 32 PG (ref 26–33)
MCHC RBC AUTO-ENTMCNC: 32.9 GM/DL (ref 32.2–35.5)
MCV RBC AUTO: 97.2 FL (ref 81–99)
MONOCYTES # BLD: 9.8 %
MONOCYTES ABSOLUTE: 0.4 THOU/MM3 (ref 0.4–1.3)
NUCLEATED RED BLOOD CELLS: 0 /100 WBC
PLATELET # BLD: 237 THOU/MM3 (ref 130–400)
PMV BLD AUTO: 11.2 FL (ref 9.4–12.4)
RBC # BLD: 3.97 MILL/MM3 (ref 4.2–5.4)
SEDIMENTATION RATE, ERYTHROCYTE: 13 MM/HR (ref 0–20)
SEG NEUTROPHILS: 52.9 %
SEGMENTED NEUTROPHILS ABSOLUTE COUNT: 2.4 THOU/MM3 (ref 1.8–7.7)
WBC # BLD: 4.5 THOU/MM3 (ref 4.8–10.8)

## 2019-06-25 ENCOUNTER — TELEPHONE (OUTPATIENT)
Dept: CARDIOLOGY CLINIC | Age: 69
End: 2019-06-25

## 2019-08-05 ENCOUNTER — NURSE ONLY (OUTPATIENT)
Dept: LAB | Age: 69
End: 2019-08-05

## 2019-08-05 LAB
ALBUMIN SERPL-MCNC: 4.3 G/DL (ref 3.5–5.1)
ALT SERPL-CCNC: 15 U/L (ref 11–66)
BASOPHILS # BLD: 1 %
BASOPHILS ABSOLUTE: 0.1 THOU/MM3 (ref 0–0.1)
CREAT SERPL-MCNC: 1.1 MG/DL (ref 0.4–1.2)
EOSINOPHIL # BLD: 2.7 %
EOSINOPHILS ABSOLUTE: 0.2 THOU/MM3 (ref 0–0.4)
ERYTHROCYTE [DISTWIDTH] IN BLOOD BY AUTOMATED COUNT: 13.3 % (ref 11.5–14.5)
ERYTHROCYTE [DISTWIDTH] IN BLOOD BY AUTOMATED COUNT: 50.5 FL (ref 35–45)
GFR SERPL CREATININE-BSD FRML MDRD: 49 ML/MIN/1.73M2
HCT VFR BLD CALC: 41.4 % (ref 37–47)
HEMOGLOBIN: 13.3 GM/DL (ref 12–16)
IMMATURE GRANS (ABS): 0.02 THOU/MM3 (ref 0–0.07)
IMMATURE GRANULOCYTES: 0 %
LYMPHOCYTES # BLD: 21.4 %
LYMPHOCYTES ABSOLUTE: 1.8 THOU/MM3 (ref 1–4.8)
MCH RBC QN AUTO: 33 PG (ref 26–33)
MCHC RBC AUTO-ENTMCNC: 32.1 GM/DL (ref 32.2–35.5)
MCV RBC AUTO: 102.7 FL (ref 81–99)
MONOCYTES # BLD: 10 %
MONOCYTES ABSOLUTE: 0.8 THOU/MM3 (ref 0.4–1.3)
NUCLEATED RED BLOOD CELLS: 0 /100 WBC
PLATELET # BLD: 261 THOU/MM3 (ref 130–400)
PMV BLD AUTO: 11.9 FL (ref 9.4–12.4)
RBC # BLD: 4.03 MILL/MM3 (ref 4.2–5.4)
SEDIMENTATION RATE, ERYTHROCYTE: 8 MM/HR (ref 0–20)
SEG NEUTROPHILS: 64.7 %
SEGMENTED NEUTROPHILS ABSOLUTE COUNT: 5.4 THOU/MM3 (ref 1.8–7.7)
WBC # BLD: 8.4 THOU/MM3 (ref 4.8–10.8)

## 2019-08-15 RX ORDER — LISINOPRIL 20 MG/1
30 TABLET ORAL DAILY
Qty: 90 TABLET | Refills: 3 | OUTPATIENT
Start: 2019-08-15

## 2019-08-19 ENCOUNTER — NURSE ONLY (OUTPATIENT)
Dept: LAB | Age: 69
End: 2019-08-19

## 2019-08-19 LAB
CALCIUM SERPL-MCNC: 10.1 MG/DL (ref 8.5–10.5)
MAGNESIUM: 1.9 MG/DL (ref 1.6–2.4)
POTASSIUM SERPL-SCNC: 4.7 MEQ/L (ref 3.5–5.2)
VITAMIN D 25-HYDROXY: 75 NG/ML (ref 30–100)

## 2019-09-17 ENCOUNTER — OFFICE VISIT (OUTPATIENT)
Dept: CARDIOLOGY CLINIC | Age: 69
End: 2019-09-17
Payer: MEDICARE

## 2019-09-17 VITALS
SYSTOLIC BLOOD PRESSURE: 150 MMHG | BODY MASS INDEX: 30.36 KG/M2 | HEIGHT: 62 IN | DIASTOLIC BLOOD PRESSURE: 102 MMHG | HEART RATE: 72 BPM | WEIGHT: 165 LBS

## 2019-09-17 DIAGNOSIS — F41.9 ANXIETY: ICD-10-CM

## 2019-09-17 DIAGNOSIS — L93.0 DISCOID LUPUS ERYTHEMATOSUS: ICD-10-CM

## 2019-09-17 DIAGNOSIS — I10 ESSENTIAL HYPERTENSION: ICD-10-CM

## 2019-09-17 DIAGNOSIS — R00.2 HEART PALPITATIONS: Primary | ICD-10-CM

## 2019-09-17 PROCEDURE — 93000 ELECTROCARDIOGRAM COMPLETE: CPT | Performed by: NURSE PRACTITIONER

## 2019-09-17 PROCEDURE — 1090F PRES/ABSN URINE INCON ASSESS: CPT | Performed by: NURSE PRACTITIONER

## 2019-09-17 PROCEDURE — G8400 PT W/DXA NO RESULTS DOC: HCPCS | Performed by: NURSE PRACTITIONER

## 2019-09-17 PROCEDURE — 4040F PNEUMOC VAC/ADMIN/RCVD: CPT | Performed by: NURSE PRACTITIONER

## 2019-09-17 PROCEDURE — 1036F TOBACCO NON-USER: CPT | Performed by: NURSE PRACTITIONER

## 2019-09-17 PROCEDURE — 1123F ACP DISCUSS/DSCN MKR DOCD: CPT | Performed by: NURSE PRACTITIONER

## 2019-09-17 PROCEDURE — 99213 OFFICE O/P EST LOW 20 MIN: CPT | Performed by: NURSE PRACTITIONER

## 2019-09-17 PROCEDURE — G8417 CALC BMI ABV UP PARAM F/U: HCPCS | Performed by: NURSE PRACTITIONER

## 2019-09-17 PROCEDURE — 3017F COLORECTAL CA SCREEN DOC REV: CPT | Performed by: NURSE PRACTITIONER

## 2019-09-17 PROCEDURE — G8427 DOCREV CUR MEDS BY ELIG CLIN: HCPCS | Performed by: NURSE PRACTITIONER

## 2019-09-17 RX ORDER — GABAPENTIN 300 MG/1
300 CAPSULE ORAL 3 TIMES DAILY
COMMUNITY

## 2019-09-17 RX ORDER — AMITRIPTYLINE HYDROCHLORIDE 10 MG/1
10 TABLET, FILM COATED ORAL NIGHTLY
COMMUNITY

## 2019-09-17 NOTE — PROGRESS NOTES
weight 165 lb (74.8 kg). General:   Well developed, well nourished  Lungs:   Clear to auscultation  Heart:    Normal S1 S2, No murmur, rubs, or gallops  Abdomen:   Soft, non tender, no organomegalies, positive bowel sounds  Extremities:   No edema, no cyanosis, good peripheral pulses  Neurological:   Awake, alert, oriented. No obvious focal deficits  Musculoskeletal:  No obvious deformities    EKG: SR, nonspecific T wave abnormality, no acute abnormalities    Stress Test: 6/21/19  Summary   No ischemic EKG changes.   Nonspecific ST-T wave changes.   The nuclear images is not suggestive for myocardial ischemia.      Signatures      ----------------------------------------------------------------   Electronically signed by Renzo Leger MD (Interpreting   Cardiologist) on 06/21/2018 at 16:40    Echo: 6/21/18  Summary   Left ventricle size is normal.  Qasim Greer increased left ventricle wall thickness.   Mild concentric left ventricular hypertrophy.   Systolic function was normal.   There were no regional wall motion abnormalities.   Ejection fraction is visually estimated at 65%.   Doppler parameters were consistent with abnormal left ventricular   relaxation (grade 1 diastolic dysfunction).      Signature      ----------------------------------------------------------------   Electronically signed by Renzo Leger MD (Interpreting   physician) on 06/23/2018 at 06:43 PM       Diagnosis Orders   1. Heart palpitations  EKG 12 Lead   2. Essential hypertension     3. Anxiety     4. Discoid lupus erythematosus         Orders Placed This Encounter   Procedures    EKG 12 Lead     Order Specific Question:   Reason for Exam?     Answer: Other   Stable cardiac wise. No chest pain, no sob. Infrequent palpitations. Has higher BP at home at times. She will monitor her BP for 2 weeks as instructed and bring to office for review. If needed, adjustments to medications can be made.  Her BP may be higher as she has not had any

## 2019-12-05 ENCOUNTER — HOSPITAL ENCOUNTER (INPATIENT)
Age: 69
LOS: 1 days | Discharge: HOME | DRG: 287 | End: 2019-12-06
Attending: EMERGENCY MEDICINE | Admitting: INTERNAL MEDICINE
Payer: MEDICARE

## 2019-12-05 ENCOUNTER — APPOINTMENT (OUTPATIENT)
Dept: GENERAL RADIOLOGY | Age: 69
DRG: 287 | End: 2019-12-05
Payer: MEDICARE

## 2019-12-05 DIAGNOSIS — R07.9 CHEST PAIN, UNSPECIFIED TYPE: Primary | ICD-10-CM

## 2019-12-05 PROBLEM — I21.4 NSTEMI (NON-ST ELEVATED MYOCARDIAL INFARCTION) (HCC): Status: ACTIVE | Noted: 2019-12-05

## 2019-12-05 LAB
ALBUMIN SERPL-MCNC: 3.9 G/DL (ref 3.5–5.1)
ALP BLD-CCNC: 58 U/L (ref 38–126)
ALT SERPL-CCNC: 16 U/L (ref 11–66)
AMPHETAMINE+METHAMPHETAMINE URINE SCREEN: NEGATIVE
ANION GAP SERPL CALCULATED.3IONS-SCNC: 14 MEQ/L (ref 8–16)
APTT: 144.7 SECONDS (ref 22–38)
APTT: 29.3 SECONDS (ref 22–38)
APTT: 29.8 SECONDS (ref 22–38)
AST SERPL-CCNC: 21 U/L (ref 5–40)
BACTERIA: ABNORMAL /HPF
BARBITURATE QUANTITATIVE URINE: NEGATIVE
BASOPHILS # BLD: 0.7 %
BASOPHILS ABSOLUTE: 0 THOU/MM3 (ref 0–0.1)
BENZODIAZEPINE QUANTITATIVE URINE: POSITIVE
BILIRUB SERPL-MCNC: 0.3 MG/DL (ref 0.3–1.2)
BILIRUBIN DIRECT: < 0.2 MG/DL (ref 0–0.3)
BILIRUBIN URINE: NEGATIVE
BLOOD, URINE: NEGATIVE
BUN BLDV-MCNC: 21 MG/DL (ref 7–22)
CALCIUM SERPL-MCNC: 9.5 MG/DL (ref 8.5–10.5)
CANNABINOID QUANTITATIVE URINE: NEGATIVE
CASTS 2: ABNORMAL /LPF
CASTS UA: ABNORMAL /LPF
CHARACTER, URINE: CLEAR
CHLORIDE BLD-SCNC: 102 MEQ/L (ref 98–111)
CO2: 24 MEQ/L (ref 23–33)
COCAINE METABOLITE QUANTITATIVE URINE: NEGATIVE
COLOR: YELLOW
CREAT SERPL-MCNC: 0.9 MG/DL (ref 0.4–1.2)
CRYSTALS, UA: ABNORMAL
EKG ATRIAL RATE: 75 BPM
EKG ATRIAL RATE: 90 BPM
EKG ATRIAL RATE: 96 BPM
EKG P AXIS: 46 DEGREES
EKG P AXIS: 49 DEGREES
EKG P AXIS: 49 DEGREES
EKG P-R INTERVAL: 154 MS
EKG P-R INTERVAL: 160 MS
EKG P-R INTERVAL: 162 MS
EKG Q-T INTERVAL: 332 MS
EKG Q-T INTERVAL: 390 MS
EKG Q-T INTERVAL: 408 MS
EKG QRS DURATION: 76 MS
EKG QTC CALCULATION (BAZETT): 419 MS
EKG QTC CALCULATION (BAZETT): 455 MS
EKG QTC CALCULATION (BAZETT): 477 MS
EKG R AXIS: -3 DEGREES
EKG R AXIS: 14 DEGREES
EKG R AXIS: 3 DEGREES
EKG T AXIS: 167 DEGREES
EKG T AXIS: 169 DEGREES
EKG T AXIS: 171 DEGREES
EKG VENTRICULAR RATE: 75 BPM
EKG VENTRICULAR RATE: 90 BPM
EKG VENTRICULAR RATE: 96 BPM
EOSINOPHIL # BLD: 3 %
EOSINOPHILS ABSOLUTE: 0.2 THOU/MM3 (ref 0–0.4)
EPITHELIAL CELLS, UA: ABNORMAL /HPF
ERYTHROCYTE [DISTWIDTH] IN BLOOD BY AUTOMATED COUNT: 12.8 % (ref 11.5–14.5)
ERYTHROCYTE [DISTWIDTH] IN BLOOD BY AUTOMATED COUNT: 45.2 FL (ref 35–45)
ETHYL ALCOHOL, SERUM: < 0.01 %
GFR SERPL CREATININE-BSD FRML MDRD: 62 ML/MIN/1.73M2
GLUCOSE BLD-MCNC: 108 MG/DL (ref 70–108)
GLUCOSE URINE: NEGATIVE MG/DL
HCT VFR BLD CALC: 38.7 % (ref 37–47)
HEMOGLOBIN: 12.8 GM/DL (ref 12–16)
IMMATURE GRANS (ABS): 0 THOU/MM3 (ref 0–0.07)
IMMATURE GRANULOCYTES: 0 %
INR BLD: 0.91 (ref 0.85–1.13)
KETONES, URINE: NEGATIVE
LEUKOCYTE ESTERASE, URINE: ABNORMAL
LIPASE: 26.3 U/L (ref 5.6–51.3)
LV EF: 63 %
LVEF MODALITY: NORMAL
LYMPHOCYTES # BLD: 41.3 %
LYMPHOCYTES ABSOLUTE: 2.4 THOU/MM3 (ref 1–4.8)
MAGNESIUM: 1.8 MG/DL (ref 1.6–2.4)
MCH RBC QN AUTO: 32.3 PG (ref 26–33)
MCHC RBC AUTO-ENTMCNC: 33.1 GM/DL (ref 32.2–35.5)
MCV RBC AUTO: 97.7 FL (ref 81–99)
MISCELLANEOUS 2: ABNORMAL
MONOCYTES # BLD: 5.9 %
MONOCYTES ABSOLUTE: 0.3 THOU/MM3 (ref 0.4–1.3)
NITRITE, URINE: NEGATIVE
NUCLEATED RED BLOOD CELLS: 0 /100 WBC
OPIATES, URINE: NEGATIVE
OSMOLALITY CALCULATION: 282.9 MOSMOL/KG (ref 275–300)
OXYCODONE: NEGATIVE
PH UA: 5.5 (ref 5–9)
PHENCYCLIDINE QUANTITATIVE URINE: NEGATIVE
PLATELET # BLD: 227 THOU/MM3 (ref 130–400)
PMV BLD AUTO: 11.1 FL (ref 9.4–12.4)
POTASSIUM SERPL-SCNC: 3.6 MEQ/L (ref 3.5–5.2)
PRO-BNP: 867.7 PG/ML (ref 0–900)
PROTEIN UA: NEGATIVE
RBC # BLD: 3.96 MILL/MM3 (ref 4.2–5.4)
RBC URINE: ABNORMAL /HPF
RENAL EPITHELIAL, UA: ABNORMAL
SEG NEUTROPHILS: 49.1 %
SEGMENTED NEUTROPHILS ABSOLUTE COUNT: 2.8 THOU/MM3 (ref 1.8–7.7)
SODIUM BLD-SCNC: 140 MEQ/L (ref 135–145)
SPECIFIC GRAVITY, URINE: 1.01 (ref 1–1.03)
TOTAL PROTEIN: 6.4 G/DL (ref 6.1–8)
TROPONIN T: 0.08 NG/ML
TROPONIN T: 0.11 NG/ML
TROPONIN T: < 0.01 NG/ML
TSH SERPL DL<=0.05 MIU/L-ACNC: 3.83 UIU/ML (ref 0.4–4.2)
UROBILINOGEN, URINE: 0.2 EU/DL (ref 0–1)
WBC # BLD: 5.7 THOU/MM3 (ref 4.8–10.8)
WBC UA: ABNORMAL /HPF
YEAST: ABNORMAL

## 2019-12-05 PROCEDURE — 71046 X-RAY EXAM CHEST 2 VIEWS: CPT

## 2019-12-05 PROCEDURE — 1200000000 HC SEMI PRIVATE

## 2019-12-05 PROCEDURE — 93005 ELECTROCARDIOGRAM TRACING: CPT | Performed by: INTERNAL MEDICINE

## 2019-12-05 PROCEDURE — 80307 DRUG TEST PRSMV CHEM ANLYZR: CPT

## 2019-12-05 PROCEDURE — 93010 ELECTROCARDIOGRAM REPORT: CPT | Performed by: INTERNAL MEDICINE

## 2019-12-05 PROCEDURE — 99285 EMERGENCY DEPT VISIT HI MDM: CPT

## 2019-12-05 PROCEDURE — 6360000002 HC RX W HCPCS: Performed by: INTERNAL MEDICINE

## 2019-12-05 PROCEDURE — 84443 ASSAY THYROID STIM HORMONE: CPT

## 2019-12-05 PROCEDURE — 85610 PROTHROMBIN TIME: CPT

## 2019-12-05 PROCEDURE — G0378 HOSPITAL OBSERVATION PER HR: HCPCS

## 2019-12-05 PROCEDURE — 81001 URINALYSIS AUTO W/SCOPE: CPT

## 2019-12-05 PROCEDURE — 93005 ELECTROCARDIOGRAM TRACING: CPT | Performed by: EMERGENCY MEDICINE

## 2019-12-05 PROCEDURE — 82248 BILIRUBIN DIRECT: CPT

## 2019-12-05 PROCEDURE — 80053 COMPREHEN METABOLIC PANEL: CPT

## 2019-12-05 PROCEDURE — 36415 COLL VENOUS BLD VENIPUNCTURE: CPT

## 2019-12-05 PROCEDURE — 83735 ASSAY OF MAGNESIUM: CPT

## 2019-12-05 PROCEDURE — 83690 ASSAY OF LIPASE: CPT

## 2019-12-05 PROCEDURE — 85025 COMPLETE CBC W/AUTO DIFF WBC: CPT

## 2019-12-05 PROCEDURE — 94760 N-INVAS EAR/PLS OXIMETRY 1: CPT

## 2019-12-05 PROCEDURE — 84484 ASSAY OF TROPONIN QUANT: CPT

## 2019-12-05 PROCEDURE — 93306 TTE W/DOPPLER COMPLETE: CPT

## 2019-12-05 PROCEDURE — G0480 DRUG TEST DEF 1-7 CLASSES: HCPCS

## 2019-12-05 PROCEDURE — 6370000000 HC RX 637 (ALT 250 FOR IP): Performed by: EMERGENCY MEDICINE

## 2019-12-05 PROCEDURE — 6370000000 HC RX 637 (ALT 250 FOR IP): Performed by: PHYSICIAN ASSISTANT

## 2019-12-05 PROCEDURE — 83880 ASSAY OF NATRIURETIC PEPTIDE: CPT

## 2019-12-05 PROCEDURE — 85730 THROMBOPLASTIN TIME PARTIAL: CPT

## 2019-12-05 PROCEDURE — 99223 1ST HOSP IP/OBS HIGH 75: CPT | Performed by: INTERNAL MEDICINE

## 2019-12-05 PROCEDURE — 99221 1ST HOSP IP/OBS SF/LOW 40: CPT | Performed by: INTERNAL MEDICINE

## 2019-12-05 PROCEDURE — 6370000000 HC RX 637 (ALT 250 FOR IP): Performed by: INTERNAL MEDICINE

## 2019-12-05 PROCEDURE — 2580000003 HC RX 258: Performed by: EMERGENCY MEDICINE

## 2019-12-05 PROCEDURE — 2709999900 HC NON-CHARGEABLE SUPPLY

## 2019-12-05 RX ORDER — ASPIRIN 81 MG/1
324 TABLET, CHEWABLE ORAL ONCE
Status: COMPLETED | OUTPATIENT
Start: 2019-12-05 | End: 2019-12-05

## 2019-12-05 RX ORDER — GABAPENTIN 100 MG/1
100 CAPSULE ORAL 3 TIMES DAILY
Status: DISCONTINUED | OUTPATIENT
Start: 2019-12-05 | End: 2019-12-06 | Stop reason: HOSPADM

## 2019-12-05 RX ORDER — PANTOPRAZOLE SODIUM 40 MG/1
40 TABLET, DELAYED RELEASE ORAL
Status: DISCONTINUED | OUTPATIENT
Start: 2019-12-06 | End: 2019-12-06 | Stop reason: HOSPADM

## 2019-12-05 RX ORDER — LOPERAMIDE HYDROCHLORIDE 2 MG/1
2 CAPSULE ORAL 4 TIMES DAILY PRN
Status: DISCONTINUED | OUTPATIENT
Start: 2019-12-05 | End: 2019-12-06 | Stop reason: HOSPADM

## 2019-12-05 RX ORDER — NITROGLYCERIN 0.4 MG/1
0.4 TABLET SUBLINGUAL ONCE
Status: COMPLETED | OUTPATIENT
Start: 2019-12-05 | End: 2019-12-05

## 2019-12-05 RX ORDER — FOLIC ACID 1 MG/1
1 TABLET ORAL DAILY
Status: DISCONTINUED | OUTPATIENT
Start: 2019-12-05 | End: 2019-12-06 | Stop reason: HOSPADM

## 2019-12-05 RX ORDER — HEPARIN SODIUM 1000 [USP'U]/ML
4000 INJECTION, SOLUTION INTRAVENOUS; SUBCUTANEOUS ONCE
Status: COMPLETED | OUTPATIENT
Start: 2019-12-05 | End: 2019-12-05

## 2019-12-05 RX ORDER — ASPIRIN 81 MG/1
81 TABLET, CHEWABLE ORAL DAILY
Status: DISCONTINUED | OUTPATIENT
Start: 2019-12-05 | End: 2019-12-06 | Stop reason: HOSPADM

## 2019-12-05 RX ORDER — PROMETHAZINE HYDROCHLORIDE 25 MG/1
25 TABLET ORAL EVERY 6 HOURS PRN
Status: DISCONTINUED | OUTPATIENT
Start: 2019-12-05 | End: 2019-12-06 | Stop reason: HOSPADM

## 2019-12-05 RX ORDER — HEPARIN SODIUM 10000 [USP'U]/100ML
12 INJECTION, SOLUTION INTRAVENOUS CONTINUOUS
Status: DISCONTINUED | OUTPATIENT
Start: 2019-12-05 | End: 2019-12-06 | Stop reason: HOSPADM

## 2019-12-05 RX ORDER — SODIUM CHLORIDE 0.9 % (FLUSH) 0.9 %
10 SYRINGE (ML) INJECTION EVERY 12 HOURS SCHEDULED
Status: DISCONTINUED | OUTPATIENT
Start: 2019-12-05 | End: 2019-12-06 | Stop reason: SDUPTHER

## 2019-12-05 RX ORDER — M-VIT,TX,IRON,MINS/CALC/FOLIC 27MG-0.4MG
1 TABLET ORAL DAILY
Status: DISCONTINUED | OUTPATIENT
Start: 2019-12-05 | End: 2019-12-06 | Stop reason: HOSPADM

## 2019-12-05 RX ORDER — FLUTICASONE PROPIONATE 50 MCG
1 SPRAY, SUSPENSION (ML) NASAL DAILY PRN
Status: DISCONTINUED | OUTPATIENT
Start: 2019-12-05 | End: 2019-12-06 | Stop reason: HOSPADM

## 2019-12-05 RX ORDER — HEPARIN SODIUM 1000 [USP'U]/ML
4000 INJECTION, SOLUTION INTRAVENOUS; SUBCUTANEOUS PRN
Status: DISCONTINUED | OUTPATIENT
Start: 2019-12-05 | End: 2019-12-06 | Stop reason: HOSPADM

## 2019-12-05 RX ORDER — FAMOTIDINE 20 MG/1
20 TABLET, FILM COATED ORAL NIGHTLY
Status: DISCONTINUED | OUTPATIENT
Start: 2019-12-05 | End: 2019-12-06 | Stop reason: HOSPADM

## 2019-12-05 RX ORDER — ZOLPIDEM TARTRATE 10 MG/1
10 TABLET ORAL NIGHTLY PRN
Status: DISCONTINUED | OUTPATIENT
Start: 2019-12-05 | End: 2019-12-06 | Stop reason: HOSPADM

## 2019-12-05 RX ORDER — 0.9 % SODIUM CHLORIDE 0.9 %
1000 INTRAVENOUS SOLUTION INTRAVENOUS ONCE
Status: COMPLETED | OUTPATIENT
Start: 2019-12-05 | End: 2019-12-05

## 2019-12-05 RX ORDER — SODIUM CHLORIDE 0.9 % (FLUSH) 0.9 %
10 SYRINGE (ML) INJECTION PRN
Status: DISCONTINUED | OUTPATIENT
Start: 2019-12-05 | End: 2019-12-06 | Stop reason: SDUPTHER

## 2019-12-05 RX ORDER — DESVENLAFAXINE 100 MG/1
100 TABLET, EXTENDED RELEASE ORAL DAILY
Status: DISCONTINUED | OUTPATIENT
Start: 2019-12-05 | End: 2019-12-06 | Stop reason: HOSPADM

## 2019-12-05 RX ORDER — ONDANSETRON 2 MG/ML
4 INJECTION INTRAMUSCULAR; INTRAVENOUS EVERY 6 HOURS PRN
Status: DISCONTINUED | OUTPATIENT
Start: 2019-12-05 | End: 2019-12-06 | Stop reason: HOSPADM

## 2019-12-05 RX ORDER — ALPRAZOLAM 0.25 MG/1
0.25 TABLET ORAL 3 TIMES DAILY
Status: DISCONTINUED | OUTPATIENT
Start: 2019-12-05 | End: 2019-12-06 | Stop reason: HOSPADM

## 2019-12-05 RX ORDER — AMITRIPTYLINE HYDROCHLORIDE 10 MG/1
10 TABLET, FILM COATED ORAL NIGHTLY
Status: DISCONTINUED | OUTPATIENT
Start: 2019-12-05 | End: 2019-12-06 | Stop reason: HOSPADM

## 2019-12-05 RX ORDER — HYDROCODONE BITARTRATE AND ACETAMINOPHEN 5; 325 MG/1; MG/1
1 TABLET ORAL 2 TIMES DAILY
Status: DISCONTINUED | OUTPATIENT
Start: 2019-12-05 | End: 2019-12-06 | Stop reason: HOSPADM

## 2019-12-05 RX ORDER — PREDNISONE 1 MG/1
1 TABLET ORAL 2 TIMES DAILY WITH MEALS
Status: DISCONTINUED | OUTPATIENT
Start: 2019-12-05 | End: 2019-12-06 | Stop reason: HOSPADM

## 2019-12-05 RX ORDER — HEPARIN SODIUM 1000 [USP'U]/ML
2000 INJECTION, SOLUTION INTRAVENOUS; SUBCUTANEOUS PRN
Status: DISCONTINUED | OUTPATIENT
Start: 2019-12-05 | End: 2019-12-06 | Stop reason: HOSPADM

## 2019-12-05 RX ADMIN — GABAPENTIN 100 MG: 100 CAPSULE ORAL at 13:18

## 2019-12-05 RX ADMIN — NITROGLYCERIN 0.4 MG: 0.4 TABLET, ORALLY DISINTEGRATING SUBLINGUAL at 06:20

## 2019-12-05 RX ADMIN — HEPARIN SODIUM 4000 UNITS: 1000 INJECTION INTRAVENOUS; SUBCUTANEOUS at 13:12

## 2019-12-05 RX ADMIN — LISINOPRIL 30 MG: 20 TABLET ORAL at 22:32

## 2019-12-05 RX ADMIN — HYDROCODONE BITARTRATE AND ACETAMINOPHEN 1 TABLET: 5; 325 TABLET ORAL at 21:01

## 2019-12-05 RX ADMIN — ASPIRIN 81 MG 81 MG: 81 TABLET ORAL at 13:18

## 2019-12-05 RX ADMIN — HEPARIN SODIUM 12 UNITS/KG/HR: 10000 INJECTION, SOLUTION INTRAVENOUS at 13:12

## 2019-12-05 RX ADMIN — FAMOTIDINE 20 MG: 20 TABLET, FILM COATED ORAL at 21:04

## 2019-12-05 RX ADMIN — FLUTICASONE PROPIONATE 1 SPRAY: 50 SPRAY, METERED NASAL at 17:45

## 2019-12-05 RX ADMIN — AMITRIPTYLINE HYDROCHLORIDE 10 MG: 10 TABLET, FILM COATED ORAL at 21:00

## 2019-12-05 RX ADMIN — METOPROLOL TARTRATE 12.5 MG: 25 TABLET, FILM COATED ORAL at 21:02

## 2019-12-05 RX ADMIN — PREDNISONE 1 MG: 1 TABLET ORAL at 13:18

## 2019-12-05 RX ADMIN — ALPRAZOLAM 0.25 MG: 0.25 TABLET ORAL at 20:59

## 2019-12-05 RX ADMIN — SODIUM CHLORIDE 1000 ML: 9 INJECTION, SOLUTION INTRAVENOUS at 06:37

## 2019-12-05 RX ADMIN — GABAPENTIN 100 MG: 100 CAPSULE ORAL at 21:00

## 2019-12-05 RX ADMIN — ASPIRIN 81 MG 324 MG: 81 TABLET ORAL at 06:20

## 2019-12-05 ASSESSMENT — ENCOUNTER SYMPTOMS
EYE ITCHING: 0
SORE THROAT: 0
WHEEZING: 0
EYE DISCHARGE: 0
CHOKING: 0
CONSTIPATION: 0
BACK PAIN: 0
CHEST TIGHTNESS: 0
PHOTOPHOBIA: 0
DIARRHEA: 0
EYE PAIN: 0
RHINORRHEA: 0
VOICE CHANGE: 0
NAUSEA: 0
SINUS PRESSURE: 0
EYE REDNESS: 0
ABDOMINAL DISTENTION: 0
BLOOD IN STOOL: 0
SHORTNESS OF BREATH: 0
VOMITING: 0
ABDOMINAL PAIN: 0
COUGH: 0
TROUBLE SWALLOWING: 0

## 2019-12-05 ASSESSMENT — PAIN SCALES - GENERAL
PAINLEVEL_OUTOF10: 2
PAINLEVEL_OUTOF10: 0
PAINLEVEL_OUTOF10: 0
PAINLEVEL_OUTOF10: 3

## 2019-12-05 ASSESSMENT — PAIN DESCRIPTION - LOCATION: LOCATION: CHEST

## 2019-12-05 ASSESSMENT — PAIN DESCRIPTION - FREQUENCY: FREQUENCY: CONTINUOUS

## 2019-12-05 ASSESSMENT — PAIN DESCRIPTION - DESCRIPTORS: DESCRIPTORS: PRESSURE

## 2019-12-06 ENCOUNTER — APPOINTMENT (OUTPATIENT)
Dept: CARDIAC CATH/INVASIVE PROCEDURES | Age: 69
DRG: 287 | End: 2019-12-06
Payer: MEDICARE

## 2019-12-06 VITALS
WEIGHT: 163.3 LBS | SYSTOLIC BLOOD PRESSURE: 138 MMHG | OXYGEN SATURATION: 95 % | DIASTOLIC BLOOD PRESSURE: 76 MMHG | HEIGHT: 62 IN | TEMPERATURE: 97.4 F | RESPIRATION RATE: 16 BRPM | BODY MASS INDEX: 30.05 KG/M2 | HEART RATE: 68 BPM

## 2019-12-06 LAB
ABO: NORMAL
ANION GAP SERPL CALCULATED.3IONS-SCNC: 10 MEQ/L (ref 8–16)
ANTIBODY SCREEN: NORMAL
APTT: 114.1 SECONDS (ref 22–38)
APTT: 36.5 SECONDS (ref 22–38)
APTT: 43.6 SECONDS (ref 22–38)
BUN BLDV-MCNC: 14 MG/DL (ref 7–22)
CALCIUM SERPL-MCNC: 8.7 MG/DL (ref 8.5–10.5)
CHLORIDE BLD-SCNC: 107 MEQ/L (ref 98–111)
CHOLESTEROL, TOTAL: 174 MG/DL (ref 100–199)
CO2: 22 MEQ/L (ref 23–33)
CREAT SERPL-MCNC: 0.9 MG/DL (ref 0.4–1.2)
EKG ATRIAL RATE: 56 BPM
EKG P AXIS: 4 DEGREES
EKG P-R INTERVAL: 138 MS
EKG Q-T INTERVAL: 478 MS
EKG QRS DURATION: 80 MS
EKG QTC CALCULATION (BAZETT): 461 MS
EKG R AXIS: 5 DEGREES
EKG T AXIS: 157 DEGREES
EKG VENTRICULAR RATE: 56 BPM
ERYTHROCYTE [DISTWIDTH] IN BLOOD BY AUTOMATED COUNT: 12.9 % (ref 11.5–14.5)
ERYTHROCYTE [DISTWIDTH] IN BLOOD BY AUTOMATED COUNT: 47.9 FL (ref 35–45)
GFR SERPL CREATININE-BSD FRML MDRD: 62 ML/MIN/1.73M2
GLUCOSE BLD-MCNC: 95 MG/DL (ref 70–108)
HCT VFR BLD CALC: 35.7 % (ref 37–47)
HDLC SERPL-MCNC: 50 MG/DL
HEMOGLOBIN: 11.5 GM/DL (ref 12–16)
INR BLD: 1.03 (ref 0.85–1.13)
LDL CHOLESTEROL CALCULATED: 108 MG/DL
MCH RBC QN AUTO: 32.3 PG (ref 26–33)
MCHC RBC AUTO-ENTMCNC: 32.2 GM/DL (ref 32.2–35.5)
MCV RBC AUTO: 100.3 FL (ref 81–99)
PLATELET # BLD: 210 THOU/MM3 (ref 130–400)
PMV BLD AUTO: 11 FL (ref 9.4–12.4)
POTASSIUM REFLEX MAGNESIUM: 3.9 MEQ/L (ref 3.5–5.2)
RBC # BLD: 3.56 MILL/MM3 (ref 4.2–5.4)
RH FACTOR: NORMAL
SODIUM BLD-SCNC: 139 MEQ/L (ref 135–145)
TRIGL SERPL-MCNC: 79 MG/DL (ref 0–199)
WBC # BLD: 5.7 THOU/MM3 (ref 4.8–10.8)

## 2019-12-06 PROCEDURE — 93010 ELECTROCARDIOGRAM REPORT: CPT | Performed by: INTERNAL MEDICINE

## 2019-12-06 PROCEDURE — 36415 COLL VENOUS BLD VENIPUNCTURE: CPT

## 2019-12-06 PROCEDURE — 6360000002 HC RX W HCPCS: Performed by: INTERNAL MEDICINE

## 2019-12-06 PROCEDURE — C1894 INTRO/SHEATH, NON-LASER: HCPCS

## 2019-12-06 PROCEDURE — 6360000004 HC RX CONTRAST MEDICATION: Performed by: INTERNAL MEDICINE

## 2019-12-06 PROCEDURE — C1769 GUIDE WIRE: HCPCS

## 2019-12-06 PROCEDURE — 93458 L HRT ARTERY/VENTRICLE ANGIO: CPT | Performed by: INTERNAL MEDICINE

## 2019-12-06 PROCEDURE — 85730 THROMBOPLASTIN TIME PARTIAL: CPT

## 2019-12-06 PROCEDURE — 6370000000 HC RX 637 (ALT 250 FOR IP): Performed by: PHYSICIAN ASSISTANT

## 2019-12-06 PROCEDURE — 85027 COMPLETE CBC AUTOMATED: CPT

## 2019-12-06 PROCEDURE — 93005 ELECTROCARDIOGRAM TRACING: CPT | Performed by: INTERNAL MEDICINE

## 2019-12-06 PROCEDURE — 86901 BLOOD TYPING SEROLOGIC RH(D): CPT

## 2019-12-06 PROCEDURE — 6360000002 HC RX W HCPCS

## 2019-12-06 PROCEDURE — 80061 LIPID PANEL: CPT

## 2019-12-06 PROCEDURE — 86850 RBC ANTIBODY SCREEN: CPT

## 2019-12-06 PROCEDURE — 86900 BLOOD TYPING SEROLOGIC ABO: CPT

## 2019-12-06 PROCEDURE — 99238 HOSP IP/OBS DSCHRG MGMT 30/<: CPT | Performed by: INTERNAL MEDICINE

## 2019-12-06 PROCEDURE — 6370000000 HC RX 637 (ALT 250 FOR IP): Performed by: INTERNAL MEDICINE

## 2019-12-06 PROCEDURE — 2580000003 HC RX 258: Performed by: INTERNAL MEDICINE

## 2019-12-06 PROCEDURE — 2709999900 HC NON-CHARGEABLE SUPPLY

## 2019-12-06 PROCEDURE — 85610 PROTHROMBIN TIME: CPT

## 2019-12-06 PROCEDURE — 4A023N7 MEASUREMENT OF CARDIAC SAMPLING AND PRESSURE, LEFT HEART, PERCUTANEOUS APPROACH: ICD-10-PCS | Performed by: INTERNAL MEDICINE

## 2019-12-06 PROCEDURE — B2151ZZ FLUOROSCOPY OF LEFT HEART USING LOW OSMOLAR CONTRAST: ICD-10-PCS | Performed by: INTERNAL MEDICINE

## 2019-12-06 PROCEDURE — 80048 BASIC METABOLIC PNL TOTAL CA: CPT

## 2019-12-06 PROCEDURE — B2111ZZ FLUOROSCOPY OF MULTIPLE CORONARY ARTERIES USING LOW OSMOLAR CONTRAST: ICD-10-PCS | Performed by: INTERNAL MEDICINE

## 2019-12-06 PROCEDURE — 93270 REMOTE 30 DAY ECG REV/REPORT: CPT

## 2019-12-06 PROCEDURE — 99232 SBSQ HOSP IP/OBS MODERATE 35: CPT | Performed by: PHYSICIAN ASSISTANT

## 2019-12-06 PROCEDURE — 2500000003 HC RX 250 WO HCPCS

## 2019-12-06 RX ORDER — SODIUM CHLORIDE 0.9 % (FLUSH) 0.9 %
10 SYRINGE (ML) INJECTION PRN
Status: DISCONTINUED | OUTPATIENT
Start: 2019-12-06 | End: 2019-12-06 | Stop reason: HOSPADM

## 2019-12-06 RX ORDER — NITROGLYCERIN 0.4 MG/1
0.4 TABLET SUBLINGUAL EVERY 5 MIN PRN
Status: DISCONTINUED | OUTPATIENT
Start: 2019-12-06 | End: 2019-12-06 | Stop reason: HOSPADM

## 2019-12-06 RX ORDER — ATORVASTATIN CALCIUM 20 MG/1
20 TABLET, FILM COATED ORAL DAILY
Qty: 30 TABLET | Refills: 0 | Status: SHIPPED | OUTPATIENT
Start: 2019-12-06 | End: 2019-12-06 | Stop reason: SDUPTHER

## 2019-12-06 RX ORDER — SODIUM CHLORIDE 9 MG/ML
INJECTION, SOLUTION INTRAVENOUS CONTINUOUS
Status: DISCONTINUED | OUTPATIENT
Start: 2019-12-06 | End: 2019-12-06 | Stop reason: HOSPADM

## 2019-12-06 RX ORDER — SODIUM CHLORIDE 0.9 % (FLUSH) 0.9 %
10 SYRINGE (ML) INJECTION EVERY 12 HOURS SCHEDULED
Status: DISCONTINUED | OUTPATIENT
Start: 2019-12-06 | End: 2019-12-06 | Stop reason: HOSPADM

## 2019-12-06 RX ORDER — DIPHENHYDRAMINE HYDROCHLORIDE 50 MG/ML
25 INJECTION INTRAMUSCULAR; INTRAVENOUS ONCE
Status: DISCONTINUED | OUTPATIENT
Start: 2019-12-06 | End: 2019-12-06 | Stop reason: HOSPADM

## 2019-12-06 RX ORDER — NITROGLYCERIN 0.4 MG/1
TABLET SUBLINGUAL
Qty: 25 TABLET | Refills: 3 | Status: SHIPPED | OUTPATIENT
Start: 2019-12-06

## 2019-12-06 RX ADMIN — METOPROLOL TARTRATE 12.5 MG: 25 TABLET, FILM COATED ORAL at 08:52

## 2019-12-06 RX ADMIN — ZOLPIDEM TARTRATE 10 MG: 10 TABLET, FILM COATED ORAL at 00:08

## 2019-12-06 RX ADMIN — LISINOPRIL 30 MG: 20 TABLET ORAL at 08:52

## 2019-12-06 RX ADMIN — GABAPENTIN 100 MG: 100 CAPSULE ORAL at 08:52

## 2019-12-06 RX ADMIN — PREDNISONE 1 MG: 1 TABLET ORAL at 08:52

## 2019-12-06 RX ADMIN — HYDROCODONE BITARTRATE AND ACETAMINOPHEN 1 TABLET: 5; 325 TABLET ORAL at 08:57

## 2019-12-06 RX ADMIN — ASPIRIN 81 MG 81 MG: 81 TABLET ORAL at 05:33

## 2019-12-06 RX ADMIN — IOPAMIDOL 40 ML: 755 INJECTION, SOLUTION INTRAVENOUS at 11:11

## 2019-12-06 RX ADMIN — PANTOPRAZOLE SODIUM 40 MG: 40 TABLET, DELAYED RELEASE ORAL at 05:34

## 2019-12-06 RX ADMIN — SODIUM CHLORIDE: 9 INJECTION, SOLUTION INTRAVENOUS at 05:33

## 2019-12-06 RX ADMIN — HEPARIN SODIUM 2000 UNITS: 1000 INJECTION INTRAVENOUS; SUBCUTANEOUS at 02:38

## 2019-12-06 RX ADMIN — ALPRAZOLAM 0.25 MG: 0.25 TABLET ORAL at 08:52

## 2019-12-06 RX ADMIN — FOLIC ACID 1 MG: 1 TABLET ORAL at 08:52

## 2019-12-06 ASSESSMENT — PAIN SCALES - GENERAL: PAINLEVEL_OUTOF10: 0

## 2019-12-09 RX ORDER — ATORVASTATIN CALCIUM 20 MG/1
20 TABLET, FILM COATED ORAL DAILY
Qty: 90 TABLET | Refills: 3 | Status: SHIPPED | OUTPATIENT
Start: 2019-12-09 | End: 2020-01-10

## 2020-01-09 ENCOUNTER — OFFICE VISIT (OUTPATIENT)
Dept: CARDIOLOGY CLINIC | Age: 70
End: 2020-01-09
Payer: MEDICARE

## 2020-01-09 VITALS
SYSTOLIC BLOOD PRESSURE: 138 MMHG | WEIGHT: 161.8 LBS | HEART RATE: 84 BPM | DIASTOLIC BLOOD PRESSURE: 86 MMHG | BODY MASS INDEX: 29.77 KG/M2 | HEIGHT: 62 IN

## 2020-01-09 PROCEDURE — 1090F PRES/ABSN URINE INCON ASSESS: CPT | Performed by: NURSE PRACTITIONER

## 2020-01-09 PROCEDURE — 1036F TOBACCO NON-USER: CPT | Performed by: NURSE PRACTITIONER

## 2020-01-09 PROCEDURE — G8484 FLU IMMUNIZE NO ADMIN: HCPCS | Performed by: NURSE PRACTITIONER

## 2020-01-09 PROCEDURE — 99213 OFFICE O/P EST LOW 20 MIN: CPT | Performed by: NURSE PRACTITIONER

## 2020-01-09 PROCEDURE — G8417 CALC BMI ABV UP PARAM F/U: HCPCS | Performed by: NURSE PRACTITIONER

## 2020-01-09 PROCEDURE — G8427 DOCREV CUR MEDS BY ELIG CLIN: HCPCS | Performed by: NURSE PRACTITIONER

## 2020-01-09 PROCEDURE — 1123F ACP DISCUSS/DSCN MKR DOCD: CPT | Performed by: NURSE PRACTITIONER

## 2020-01-09 PROCEDURE — G8400 PT W/DXA NO RESULTS DOC: HCPCS | Performed by: NURSE PRACTITIONER

## 2020-01-09 PROCEDURE — 3017F COLORECTAL CA SCREEN DOC REV: CPT | Performed by: NURSE PRACTITIONER

## 2020-01-09 PROCEDURE — 4040F PNEUMOC VAC/ADMIN/RCVD: CPT | Performed by: NURSE PRACTITIONER

## 2020-01-09 NOTE — PROGRESS NOTES
[facility] PROFESSIONAL SERVICES  [facility]   [address]    [address] [address]   Dept: [phone]   Dept Fax: [phone]   Loc: [phone]      Chief Complaint   Patient presents with    Follow-Up from Hospital     F/U from hospitalization for chest pain with LHC showing no significant CAD and LVEF 55-60 in 79 y/o female with history of SLE and HTN. Reports having anxiety and gets worked up and has higher BP. Has recently seen PCP and added elavil in addition to her xanax. She denies chest pain, sob, TAYLOR, lightheadedness, dizziness or syncope. She has history of intermittent palpitations and states they continue. She is still having a difficult time coping with her 's passing.      Cardiologist:  Dr. Fely Steel:   No fever, no chills, No fatigue or weight loss  Pulmonary:    No dyspnea, no wheezing  Cardiac:    Denies recent chest pain, + intermittent palpitations  GI:     No nausea or vomiting, no abdominal pain  Neuro:    No dizziness or light headedness  Musculoskeletal:  No recent active issues  Extremities:   No edema, good peripheral pulses      Past Medical History:   Diagnosis Date    Arthritis     Chronic kidney disease     Displacement of lumbar intervertebral disc     GERD (gastroesophageal reflux disease)     Heart murmur Nov 2012    Hypertension     Irritable bowel syndrome     Lupus ([address]     Movement disorder     Neuromuscular disorder ([address]     lupus    Psychiatric problem     depression    Spinal stenosis     Spondylisthesis     Thyroid disease     Vasculitis ([facility] [address] 04/2016    Dr. Natan Mtz     Vasculitis [facility]) 2017       Allergies   Allergen Reactions    Ciprofloxacin Nausea Only    Levaquin [Levofloxacin] Nausea Only    Morphine      Does not tolerate morphine well; has nightmares and feels \"crazy\"    Plaquenil [Hydroxychloroquine] Rash    Zofran [Ondansetron] Nausea And Vomiting       Current Outpatient Medications   Medication Sig Dispense Refill    metoprolol tartrate (LOPRESSOR) 25 MG tablet Take 1 tablet by mouth 2 times daily 180 tablet 3    atorvastatin (LIPITOR) 20 MG tablet TAKE 1 TABLET BY MOUTH DAILY. 90 tablet 3    nitroGLYCERIN (NITROSTAT) 0.4 MG SL tablet up to max of 3 total doses. If no relief after 1 dose, call 911. 25 tablet 3    gabapentin (NEURONTIN) 100 MG capsule Take 100 mg by mouth 3 times daily.  amitriptyline (ELAVIL) 10 MG tablet Take 10 mg by mouth nightly      fluticasone (CUTIVATE) 0.05 % cream Apply topically 2 times daily as needed Apply topically 2 times daily as needed.  clobetasol (TEMOVATE) 0.05 % cream Apply topically daily as needed Apply topically 2 times daily.  lisinopril (PRINIVIL;ZESTRIL) 20 MG tablet Take 1.5 tablets by mouth daily 90 tablet 3    desvenlafaxine succinate (PRISTIQ) 100 MG TB24 extended release tablet Take by mouth daily      NONFORMULARY Restless leg homeopathic 1 tab as needed      NONFORMULARY Calcium with VIt D daily      Methotrexate, PF, (RASUVO) 15 MG/0.3ML chemo injection pen Inject 15 mg into the skin once a week      famotidine (PEPCID) 20 MG tablet Take 20 mg by mouth 2 times daily      methotrexate (RHEUMATREX) 2.5 MG chemo tablet Take 2.5 mg by mouth once a week Take 5 tablets weekly      Multiple Vitamins-Minerals (MULTIVITAMIN GUMMIES WOMENS PO) Take by mouth 1 daily      fexofenadine (ALLEGRA) 180 MG tablet Take 180 mg by mouth as needed      L-Arginine 500 MG TABS Take 500 mg by mouth daily      NONFORMULARY Take 250 mg by mouth as needed Acetoimophen-aspirin takes as needed      predniSONE (DELTASONE) 1 MG tablet Take 1 mg by mouth 2 times daily       HYDROcodone-acetaminophen (NORCO) 5-325 MG per tablet Take 1 tablet by mouth 2 times daily.        aspirin 81 MG chewable tablet Take 81 mg by mouth daily      promethazine (PHENERGAN) 25 MG tablet Take 25 mg by mouth every 6 hours as needed for Nausea      loperamide (IMODIUM) 2 MG capsule Take 1 capsule by mouth 4 times daily as needed for Diarrhea (may take up to 6 capsules per day if needed.) 60 capsule 2    Homeopathic Products (ZICAM COLD REMEDY PO) Take 1 tablet by mouth every 3 hours as needed       zolpidem (AMBIEN) 10 MG tablet Take 10 mg by mouth nightly as needed for Sleep       omeprazole (PRILOSEC) 40 MG capsule Take 40 mg by mouth as needed       ALPRAZolam (XANAX) 0.25 MG tablet   Take 0.25 mg by mouth 3 times daily       fluticasone (FLONASE) 50 MCG/ACT nasal spray 1 spray by Nasal route daily as needed.  folic acid (FOLVITE) 1 MG tablet Take 1 mg by mouth daily. No current facility-administered medications for this visit. Social History     Socioeconomic History    Marital status:       Spouse name: None    Number of children: 3    Years of education: None    Highest education level: None   Occupational History    None   Social Needs    Financial resource strain: None    Food insecurity:     Worry: None     Inability: None    Transportation needs:     Medical: None     Non-medical: None   Tobacco Use    Smoking status: Former Smoker     Packs/day: 0.25     Years: 40.00     Pack years: 10.00    Smokeless tobacco: Never Used   Substance and Sexual Activity    Alcohol use: No    Drug use: No    Sexual activity: Not Currently   Lifestyle    Physical activity:     Days per week: None     Minutes per session: None    Stress: None   Relationships    Social connections:     Talks on phone: None     Gets together: None     Attends [employer] service: None     Active member of club or organization: None     Attends meetings of clubs or organizations: None     Relationship status: None    Intimate partner violence:     Fear of current or ex partner: None     Emotionally abused: None     Physically abused: None     Forced sexual activity: None   Other Topics Concern    None   Social History Narrative    None Family History   Problem Relation Age of Onset    Cancer Mother     Heart Disease Father     Asthma Brother     High Blood Pressure Brother     Heart Disease Brother     Arthritis Maternal Grandmother        Blood pressure 138/86, pulse 84, height 5' 2\" (1.575 m), weight 161 lb 12.8 oz (73.4 kg). General:   Well developed, well nourished  Lungs:   Clear to auscultation  Heart:    Normal S1 S2, No murmur, rubs, or gallops  Abdomen:   Soft, non tender, no organomegalies, positive bowel sounds  Extremities:   No edema, no cyanosis, good peripheral pulses  Neurological:   Awake, alert, oriented. No obvious focal deficits  Musculoskeletal:  No obvious deformities    EKG:     [facility]: 12/6/19  CORONARY ANGIOGRAM:  LEFT MAIN:  Patent without any significant obstruction. LAD:  Patent without any significant obstruction. Gives rise to a large  diagonal branch without any significant obstruction. LCX:  No significant obstruction. Gives rise to large OM1 and OM2  systems without any significant obstruction. RCA:  No significant obstruction. Gives rise to large PDA and PLB, both  without any significant obstruction. RCA is dominant.     LVEF is 55% to 60% with no regional wall motion abnormality. No  significant mitral regurgitation. Aortic valve is tricuspid. No  significant aneurysm or dissection is seen at the visualized portion of  the descending aorta. LV systolic pressure 595. LVEDP 18. No  significant LV to AO systolic gradient.   SUMMARY:  No significant coronary artery disease; preserved LVEF; EDP  18.     PLAN:  1. Bed rest.  2.  Optimal medical therapy. 3.  Risk factor management. 4.  Judicious diuresis. 5.  Routine access site care. 6.  Follow up with myself or primary cardiologist in one to two weeks  postprocedure.     All the above was explained to the patient and the patient's family.    They are agreeable and amenable to the above plan.     Adriana Fofana MD    TEST TYPE:  30-day needed

## 2020-01-10 RX ORDER — ATORVASTATIN CALCIUM 20 MG/1
20 TABLET, FILM COATED ORAL DAILY
Qty: 30 TABLET | Refills: 7 | Status: SHIPPED | OUTPATIENT
Start: 2020-01-10 | End: 2020-10-15 | Stop reason: SDUPTHER

## 2020-01-14 ENCOUNTER — NURSE ONLY (OUTPATIENT)
Dept: LAB | Age: 70
End: 2020-01-14

## 2020-01-14 LAB
ALBUMIN SERPL-MCNC: 4.4 G/DL (ref 3.5–5.1)
ALT SERPL-CCNC: 22 U/L (ref 11–66)
BASOPHILS # BLD: 1.1 %
BASOPHILS ABSOLUTE: 0.1 THOU/MM3 (ref 0–0.1)
CREAT SERPL-MCNC: 0.9 MG/DL (ref 0.4–1.2)
EOSINOPHIL # BLD: 3.5 %
EOSINOPHILS ABSOLUTE: 0.2 THOU/MM3 (ref 0–0.4)
ERYTHROCYTE [DISTWIDTH] IN BLOOD BY AUTOMATED COUNT: 12.5 % (ref 11.5–14.5)
ERYTHROCYTE [DISTWIDTH] IN BLOOD BY AUTOMATED COUNT: 46.8 FL (ref 35–45)
GFR SERPL CREATININE-BSD FRML MDRD: 62 ML/MIN/1.73M2
HCT VFR BLD CALC: 41.5 % (ref 37–47)
HEMOGLOBIN: 13 GM/DL (ref 12–16)
IMMATURE GRANS (ABS): 0.01 THOU/MM3 (ref 0–0.07)
IMMATURE GRANULOCYTES: 0.2 %
LYMPHOCYTES # BLD: 31.6 %
LYMPHOCYTES ABSOLUTE: 2.1 THOU/MM3 (ref 1–4.8)
MCH RBC QN AUTO: 32.1 PG (ref 26–33)
MCHC RBC AUTO-ENTMCNC: 31.3 GM/DL (ref 32.2–35.5)
MCV RBC AUTO: 102.5 FL (ref 81–99)
MONOCYTES # BLD: 6.6 %
MONOCYTES ABSOLUTE: 0.4 THOU/MM3 (ref 0.4–1.3)
NUCLEATED RED BLOOD CELLS: 0 /100 WBC
PLATELET # BLD: 227 THOU/MM3 (ref 130–400)
PMV BLD AUTO: 11.4 FL (ref 9.4–12.4)
RBC # BLD: 4.05 MILL/MM3 (ref 4.2–5.4)
SEDIMENTATION RATE, ERYTHROCYTE: 14 MM/HR (ref 0–20)
SEG NEUTROPHILS: 57 %
SEGMENTED NEUTROPHILS ABSOLUTE COUNT: 3.8 THOU/MM3 (ref 1.8–7.7)
WBC # BLD: 6.6 THOU/MM3 (ref 4.8–10.8)

## 2020-03-16 ENCOUNTER — HOSPITAL ENCOUNTER (OUTPATIENT)
Dept: MRI IMAGING | Age: 70
Discharge: HOME | End: 2020-03-16
Payer: MEDICARE

## 2020-03-16 PROCEDURE — 72148 MRI LUMBAR SPINE W/O DYE: CPT

## 2020-03-25 PROBLEM — N17.9 ACUTE KIDNEY INJURY (HCC): Status: RESOLVED | Noted: 2020-03-25 | Resolved: 2020-03-24

## 2020-06-05 RX ORDER — LISINOPRIL 30 MG/1
30 TABLET ORAL DAILY
Qty: 90 TABLET | Refills: 3 | OUTPATIENT
Start: 2020-06-05 | End: 2021-05-03

## 2020-09-17 ENCOUNTER — OFFICE VISIT (OUTPATIENT)
Dept: CARDIOLOGY CLINIC | Age: 70
End: 2020-09-17
Payer: MEDICARE

## 2020-09-17 VITALS
HEIGHT: 62 IN | BODY MASS INDEX: 29.81 KG/M2 | WEIGHT: 162 LBS | DIASTOLIC BLOOD PRESSURE: 76 MMHG | HEART RATE: 88 BPM | SYSTOLIC BLOOD PRESSURE: 122 MMHG

## 2020-09-17 PROCEDURE — 1090F PRES/ABSN URINE INCON ASSESS: CPT | Performed by: INTERNAL MEDICINE

## 2020-09-17 PROCEDURE — G8400 PT W/DXA NO RESULTS DOC: HCPCS | Performed by: INTERNAL MEDICINE

## 2020-09-17 PROCEDURE — 4040F PNEUMOC VAC/ADMIN/RCVD: CPT | Performed by: INTERNAL MEDICINE

## 2020-09-17 PROCEDURE — 99213 OFFICE O/P EST LOW 20 MIN: CPT | Performed by: INTERNAL MEDICINE

## 2020-09-17 PROCEDURE — 1123F ACP DISCUSS/DSCN MKR DOCD: CPT | Performed by: INTERNAL MEDICINE

## 2020-09-17 PROCEDURE — G8427 DOCREV CUR MEDS BY ELIG CLIN: HCPCS | Performed by: INTERNAL MEDICINE

## 2020-09-17 PROCEDURE — 3017F COLORECTAL CA SCREEN DOC REV: CPT | Performed by: INTERNAL MEDICINE

## 2020-09-17 PROCEDURE — 1036F TOBACCO NON-USER: CPT | Performed by: INTERNAL MEDICINE

## 2020-09-17 PROCEDURE — G8417 CALC BMI ABV UP PARAM F/U: HCPCS | Performed by: INTERNAL MEDICINE

## 2020-09-17 ASSESSMENT — ENCOUNTER SYMPTOMS
ORTHOPNEA: 0
SPUTUM PRODUCTION: 0
COUGH: 0
CHANGE IN BOWEL HABIT: 0
CONSTIPATION: 0
HEMOPTYSIS: 0
BLOATING: 0
BACK PAIN: 0
EYE PAIN: 0
ABDOMINAL PAIN: 0
BLURRED VISION: 0
HOARSE VOICE: 0
DIARRHEA: 0
BOWEL INCONTINENCE: 0
SHORTNESS OF BREATH: 1
EYE DISCHARGE: 0
DOUBLE VISION: 0

## 2020-09-17 NOTE — PROGRESS NOTES
Dilshad Lopez  CARDIOLOGY  [facility] [address]  [place] [address]  Dept: [phone]  Dept Fax: [phone]  Loc: [phone]    Visit Date: 9/17/2020    Ms. Ascencion Bonner is a 69 y.o. female  who presented for:  Chief Complaint   Patient presents with    Follow-up       HPI:   68 yo F c hx of SLE, HTN is here for a follow up. Denies any chest pain, sob, palpitations, lightheadedness, dizziness, orthopnea, PND or pedal edema. Had cath which showed no significant disease, normal LVSF. Current Outpatient Medications:     lisinopril (PRINIVIL;ZESTRIL) 30 MG tablet, Take 1 tablet by mouth daily, Disp: 90 tablet, Rfl: 3    atorvastatin (LIPITOR) 20 MG tablet, TAKE 1 TABLET BY MOUTH DAILY. , Disp: 30 tablet, Rfl: 7    metoprolol tartrate (LOPRESSOR) 25 MG tablet, Take 1 tablet by mouth 2 times daily, Disp: 180 tablet, Rfl: 3    nitroGLYCERIN (NITROSTAT) 0.4 MG SL tablet, up to max of 3 total doses. If no relief after 1 dose, call 911., Disp: 25 tablet, Rfl: 3    gabapentin (NEURONTIN) 300 MG capsule, Take 300 mg by mouth 3 times daily. , Disp: , Rfl:     amitriptyline (ELAVIL) 10 MG tablet, Take 10 mg by mouth nightly, Disp: , Rfl:     fluticasone (CUTIVATE) 0.05 % cream, Apply topically 2 times daily as needed Apply topically 2 times daily as needed. , Disp: , Rfl:     clobetasol (TEMOVATE) 0.05 % cream, Apply topically daily as needed Apply topically 2 times daily. , Disp: , Rfl:     desvenlafaxine succinate (PRISTIQ) 100 MG TB24 extended release tablet, Take by mouth daily, Disp: , Rfl:     NONFORMULARY, Restless leg homeopathic 1 tab as needed, Disp: , Rfl:     NONFORMULARY, Calcium with VIt D daily, Disp: , Rfl:     Methotrexate, PF, (RASUVO) 15 MG/0.3ML chemo injection pen, Inject 15 mg into the skin once a week, Disp: , Rfl:     famotidine (PEPCID) 20 MG tablet, Take 20 mg by mouth 2 times daily, Disp: , Rfl:     methotrexate (RHEUMATREX) 2.5 MG chemo tablet, Take 2.5 mg by mouth once a week Take 5 tablets weekly, Disp: , Rfl:     Multiple Vitamins-Minerals (MULTIVITAMIN GUMMIES WOMENS PO), Take by mouth 1 daily, Disp: , Rfl:     fexofenadine (ALLEGRA) 180 MG tablet, Take 180 mg by mouth as needed, Disp: , Rfl:     L-Arginine 500 MG TABS, Take 500 mg by mouth daily, Disp: , Rfl:     NONFORMULARY, Take 250 mg by mouth as needed Acetoimophen-aspirin takes as needed, Disp: , Rfl:     predniSONE (DELTASONE) 1 MG tablet, Take 1 mg by mouth 2 times daily , Disp: , Rfl:     HYDROcodone-acetaminophen (NORCO) 5-325 MG per tablet, Take 1 tablet by mouth 2 times daily. , Disp: , Rfl:     aspirin 81 MG chewable tablet, Take 81 mg by mouth daily, Disp: , Rfl:     promethazine (PHENERGAN) 25 MG tablet, Take 25 mg by mouth every 6 hours as needed for Nausea, Disp: , Rfl:     loperamide (IMODIUM) 2 MG capsule, Take 1 capsule by mouth 4 times daily as needed for Diarrhea (may take up to 6 capsules per day if needed.), Disp: 60 capsule, Rfl: 2    Homeopathic Products (ZICAM COLD REMEDY PO), Take 1 tablet by mouth every 3 hours as needed , Disp: , Rfl:     zolpidem (AMBIEN) 10 MG tablet, Take 10 mg by mouth nightly as needed for Sleep , Disp: , Rfl:     omeprazole (PRILOSEC) 40 MG capsule, Take 40 mg by mouth as needed , Disp: , Rfl:     ALPRAZolam (XANAX) 0.5 MG tablet, Take 0.5 mg by mouth 3 times daily. , Disp: , Rfl:     fluticasone (FLONASE) 50 MCG/ACT nasal spray, 1 spray by Nasal route daily as needed. , Disp: , Rfl:     folic acid (FOLVITE) 1 MG tablet, Take 1 mg by mouth daily.   , Disp: , Rfl:     Past Medical History  Olinda Zepeda  has a past medical history of Arthritis, Chronic kidney disease, Displacement of lumbar intervertebral disc, GERD (gastroesophageal reflux disease), Heart murmur, Hypertension, Irritable bowel syndrome, Lupus ([facility] [address] Movement disorder, Neuromuscular disorder ([place] [address] Psychiatric problem, Spinal stenosis, Spondylisthesis, Thyroid disease, Vasculitis ([facility] [address] and Vasculitis ([facility] [address] Social History  Maylin  reports that she has quit smoking. She has a 10.00 pack-year smoking history. She has never used smokeless tobacco. She reports that she does not drink alcohol or use drugs. Family History  Maylin family history includes Arthritis in her maternal grandmother; Asthma in her brother; Cancer in her mother; Heart Disease in her brother and father; High Blood Pressure in her brother. Past Surgical History   Past Surgical History:   Procedure Laterality Date    ABDOMEN SURGERY      APPENDECTOMY  1987   [facility] SURGERY      BUNIONECTOMY  @ 5570-4054    COLONOSCOPY      ENDOSCOPY, COLON, DIAGNOSTIC      HYSTERECTOMY  1987    OTHER SURGICAL HISTORY  11/26/2012    [id number]. POSTEROLATERAL FUSION L5-S1 .POST. LUMBAR INTERBODY FUSION ILIAC BONE GRAFT AND LEGACY CAPSTONE    PARATHYROIDECTOMY         Subjective:     Review of Systems   Constitution: Negative for decreased appetite, diaphoresis, fever and malaise/fatigue. HENT: Negative for congestion, hearing loss and hoarse voice. Eyes: Negative for blurred vision, discharge, double vision and pain. Cardiovascular: Positive for chest pain and dyspnea on exertion. Negative for claudication, cyanosis, irregular heartbeat, leg swelling, near-syncope, orthopnea, palpitations, paroxysmal nocturnal dyspnea and syncope. Respiratory: Positive for shortness of breath. Negative for cough, hemoptysis and sputum production. Endocrine: Negative for cold intolerance, heat intolerance, polydipsia, polyphagia and polyuria. Musculoskeletal: Negative for arthritis, back pain, falls, gout, joint pain and joint swelling. Gastrointestinal: Negative for bloating, abdominal pain, anorexia, change in bowel habit, bowel incontinence, constipation and diarrhea. Genitourinary: Negative for bladder incontinence, dysuria, flank pain, frequency and hematuria.    Neurological: Negative 01/14/2020    MPV 11.4 01/14/2020       Lab Results   Component Value Date     12/06/2019    K 3.9 12/06/2019     12/06/2019    CO2 22 12/06/2019    BUN 14 12/06/2019    LABALBU 4.4 01/14/2020    LABALBU 3.9 04/14/2012    CREATININE 0.9 01/14/2020    CALCIUM 8.7 12/06/2019    LABGLOM 62 01/14/2020    GLUCOSE 95 12/06/2019       Lab Results   Component Value Date    ALKPHOS 58 12/05/2019    ALT 22 01/14/2020    AST 21 12/05/2019    PROT 6.4 12/05/2019    BILITOT 0.3 12/05/2019    BILIDIR <0.2 12/05/2019    LABALBU 4.4 01/14/2020    LABALBU 3.9 04/14/2012       Lab Results   Component Value Date    MG 1.8 12/05/2019       Lab Results   Component Value Date    INR 1.03 12/06/2019    INR 0.91 12/05/2019    INR 0.85 01/07/2019         No results found for: LABA1C    Lab Results   Component Value Date    TRIG 79 12/06/2019    HDL 50 12/06/2019    LDLCALC 108 12/06/2019       Lab Results   Component Value Date    TSH 3.830 12/05/2019         Testing Reviewed:      I haveindividually reviewed the below cardiac tests    EKG:SR with LVH with repol    ECHO:   Results for orders placed during the hospital encounter of 06/21/18   ECHO Complete 2D W Doppler W Color    Narrative Transthoracic Echocardiography Report (TTE)     Demographics      Patient Name    Aleen Leyden  Gender               Female                   J      MR #            [id number]        Race                                                        Ethnicity      Account #       [de-identified]        Room Number      Accession       [id number]        Date of Study        06/21/2018   Number      Date of Birth   1950       Referring Physician  Rubén Hand MD      Age             75 year(s)       Mark Carolina Sa, MD                                    Physician Procedure    Type of Study      TTE procedure:ECHOCARDIOGRAM COMPLETE 2D W DOPPLER W COLOR. Procedure Date  Date: 06/21/2018 Start: 01:30 PM    Study Location: Echo Lab  Technical Quality: Adequate visualization    Indications:Preop cardiac evaluation. Additional Medical History:Hypotension, GERD, Acute kidney injury,  Palpitations, Abnormal EKG, Arthritis, Tobacco abuse    Patient Status: Routine    Height: 63 inches Weight: 163 pounds BSA: 1.77 m^2 BMI: 28.87 kg/m^2    BP: 148/82 mmHg     Conclusions      Summary   Left ventricle size is normal.   Mildly increased left ventricle wall thickness. Mild concentric left ventricular hypertrophy. Systolic function was normal.   There were no regional wall motion abnormalities. Ejection fraction is visually estimated at 65%. Doppler parameters were consistent with abnormal left ventricular   relaxation (grade 1 diastolic dysfunction). Signature      ----------------------------------------------------------------   Electronically signed by Cezar Miller MD (Interpreting   physician) on 06/23/2018 at 06:43 PM   ----------------------------------------------------------------      Findings      Mitral Valve   The mitral valve structure was normal with normal leaflet separation. DOPPLER: The transmitral velocity was within the normal range with no   evidence for mitral stenosis. Mild mitral regurgitation is present. Aortic Valve   The aortic valve was trileaflet with normal thickness and cuspal   separation. DOPPLER: Transaortic velocity was within the normal range with   no evidence of aortic stenosis. There was no evidence of aortic   regurgitation. Tricuspid Valve   The tricuspid valve structure was normal with normal leaflet separation. DOPPLER: There was no evidence of tricuspid stenosis. Mild tricuspid regurgitation visualized. Right ventricular systolic pressure measures 30 mmhg.       Pulmonic Valve   The pulmonic valve leaflets exhibited normal thickness, no calcification,   and normal cuspal separation. DOPPLER: The transpulmonic velocity was   within the normal range with no evidence for regurgitation. Left Atrium   The left atrium is Moderately dilated. Left Ventricle   Left ventricle size is normal.   Mildly increased left ventricle wall thickness. Mild concentric left ventricular hypertrophy. Systolic function was normal.   There were no regional wall motion abnormalities. Ejection fraction is visually estimated at 65%. Doppler parameters were consistent with abnormal left ventricular   relaxation (grade 1 diastolic dysfunction). Right Atrium   Right atrial size was normal.      Right Ventricle   The right ventricular size was normal with normal systolic function and   wall thickness. Pericardial Effusion   The pericardium was normal in appearance with no evidence of a pericardial   effusion. Pleural Effusion   No evidence of pleural effusion. Aorta / Great Vessels   -Aortic root dimension within normal limits.   -The Pulmonary artery is within normal limits. -IVC size is within normal limits with normal respiratory phasic changes.      M-Mode/2D Measurements & Calculations      LV Diastolic    LV Systolic Dimension: 6.39  AV Cusp Separation: 2 cmLA   Dimension: 4.3  cm                           Dimension: 2.9 cmAO Root   cm              LV Volume Diastolic: 75.0 ml Dimension: 3 cm   LV FS:29.1 %    LV Volume Systolic: 89.4 ml   LV PW           LV EDV/LV EDV Index: 43.7   Diastolic: 5.61 NV/03 M^7TU ESV/LV ESV   cm              Index: 36.4 ml/21 m^2        RV Diastolic Dimension: 8.36   Septum          EF Calculated: 56.2 %        cm   Diastolic: 6.94   cm                                           LA/Aorta: 0.97     Doppler Measurements & Calculations      MV Peak E-Wave: 45.1 cm/s AV Peak Velocity: 178  LVOT Peak Velocity: 131   MV Peak A-Wave: 72.8 cm/s cm/s                   cm/s   MV E/A Ratio: 0.62        AV Peak Gradient:      LVOT Peak Gradient: 7   MV Peak Gradient: 0.81    12.67 mmHg             mmHg   mmHg                                                    TV Peak E-Wave: 41.7 cm/s   MV Deceleration Time: 327                        TV Peak A-Wave: 42.2 cm/s   msec                                                    TV Peak Gradient: 0.7                                                    mmHg   MV E' Septal Velocity:                           TR Velocity:220 cm/s   3.7 cm/s                  AV DVI (Vmax):0.74     TR Gradient:19.36 mmHg   MV A' Septal Velocity:                           PV Peak Velocity: 66 cm/s   6.2 cm/s                                         PV Peak Gradient: 1.74                                                    mmHg   E/E' septal: 12.19   MR Velocity: 530 cm/s     [id number]%3d       STRESS:6/2018: Conclusions      Summary   No ischemic EKG changes.   Nonspecific ST-T wave changes.   The nuclear images is not suggestive for myocardial ischemia. CATH:    Assessment/Plan       Diagnosis Orders   1. Dyspnea, unspecified type       Shortness of breath  Chronic back pain  SLE  HTN    Denies any cardiac symptoms  EKG with LVH and repol  On lisinopril  Reviewed Echo and stress test from 6 months ago  Reports good exercise tolerance  The patient is asked to make an attempt to improve diet and exercise patterns to aid in medical management of this problem. Advised patient to call office or seek immediate medical attention if there is any new onset of  any chest pain, sob, palpitations, lightheadedness, dizziness, orthopnea, PND or pedal edema. Thank youfor allowing me to participate in the care of this patient. Please do not hesitate to contact me for any further questions.      Return in about 1 year (around 9/17/2021), or if symptoms worsen

## 2020-10-15 RX ORDER — ATORVASTATIN CALCIUM 20 MG/1
20 TABLET, FILM COATED ORAL DAILY
Qty: 90 TABLET | Refills: 3 | Status: SHIPPED | OUTPATIENT
Start: 2020-10-15 | End: 2021-09-29

## 2021-02-04 ENCOUNTER — HOSPITAL ENCOUNTER (OUTPATIENT)
Dept: MRI IMAGING | Age: 71
Discharge: HOME | End: 2021-02-04
Payer: MEDICARE

## 2021-02-04 DIAGNOSIS — M51.36 BULGING LUMBAR DISC: ICD-10-CM

## 2021-02-04 DIAGNOSIS — M48.061 SPINAL STENOSIS OF LUMBAR REGION, UNSPECIFIED WHETHER NEUROGENIC CLAUDICATION PRESENT: ICD-10-CM

## 2021-02-04 PROCEDURE — 72148 MRI LUMBAR SPINE W/O DYE: CPT

## 2021-05-02 ENCOUNTER — TELEPHONE (OUTPATIENT)
Dept: CARDIOLOGY CLINIC | Age: 71
End: 2021-05-02

## 2021-05-03 RX ORDER — LISINOPRIL 30 MG/1
TABLET ORAL
Qty: 90 TABLET | Refills: 0 | Status: SHIPPED | OUTPATIENT
Start: 2021-05-03

## 2021-06-24 ENCOUNTER — HOSPITAL ENCOUNTER (OUTPATIENT)
Dept: MRI IMAGING | Age: 71
Discharge: HOME | End: 2021-06-24
Payer: MEDICARE

## 2021-06-24 DIAGNOSIS — M25.511 RIGHT SHOULDER PAIN, UNSPECIFIED CHRONICITY: ICD-10-CM

## 2021-06-24 PROCEDURE — 73221 MRI JOINT UPR EXTREM W/O DYE: CPT

## 2021-09-29 RX ORDER — ATORVASTATIN CALCIUM 20 MG/1
20 TABLET, FILM COATED ORAL DAILY
Qty: 90 TABLET | Refills: 0 | Status: SHIPPED | OUTPATIENT
Start: 2021-09-29

## 2021-10-22 ENCOUNTER — HOSPITAL ENCOUNTER (INPATIENT)
Age: 71
LOS: 2 days | Discharge: HOME | DRG: 287 | End: 2021-10-24
Attending: NURSE PRACTITIONER | Admitting: NURSE PRACTITIONER
Payer: MEDICARE

## 2021-10-22 ENCOUNTER — APPOINTMENT (OUTPATIENT)
Dept: GENERAL RADIOLOGY | Age: 71
DRG: 287 | End: 2021-10-22
Payer: MEDICARE

## 2021-10-22 ENCOUNTER — APPOINTMENT (OUTPATIENT)
Dept: CT IMAGING | Age: 71
DRG: 287 | End: 2021-10-22
Payer: MEDICARE

## 2021-10-22 DIAGNOSIS — R07.9 CHEST PAIN, UNSPECIFIED TYPE: Primary | ICD-10-CM

## 2021-10-22 LAB
ALBUMIN SERPL-MCNC: 3.8 G/DL (ref 3.5–5.1)
ALP BLD-CCNC: 79 U/L (ref 38–126)
ALT SERPL-CCNC: 45 U/L (ref 11–66)
ANION GAP SERPL CALCULATED.3IONS-SCNC: 12 MEQ/L (ref 8–16)
APTT: 29.1 SECONDS (ref 22–38)
APTT: 35.6 SECONDS (ref 22–38)
AST SERPL-CCNC: 40 U/L (ref 5–40)
BASOPHILS # BLD: 0.6 %
BASOPHILS ABSOLUTE: 0 THOU/MM3 (ref 0–0.1)
BILIRUB SERPL-MCNC: 0.3 MG/DL (ref 0.3–1.2)
BUN BLDV-MCNC: 18 MG/DL (ref 7–22)
CALCIUM SERPL-MCNC: 9 MG/DL (ref 8.5–10.5)
CHLORIDE BLD-SCNC: 102 MEQ/L (ref 98–111)
CO2: 27 MEQ/L (ref 23–33)
CREAT SERPL-MCNC: 1.1 MG/DL (ref 0.4–1.2)
EKG ATRIAL RATE: 60 BPM
EKG ATRIAL RATE: 63 BPM
EKG ATRIAL RATE: 79 BPM
EKG P AXIS: 48 DEGREES
EKG P AXIS: 49 DEGREES
EKG P AXIS: 51 DEGREES
EKG P-R INTERVAL: 164 MS
EKG P-R INTERVAL: 166 MS
EKG P-R INTERVAL: 172 MS
EKG Q-T INTERVAL: 410 MS
EKG Q-T INTERVAL: 450 MS
EKG Q-T INTERVAL: 460 MS
EKG QRS DURATION: 78 MS
EKG QRS DURATION: 82 MS
EKG QRS DURATION: 84 MS
EKG QTC CALCULATION (BAZETT): 460 MS
EKG QTC CALCULATION (BAZETT): 460 MS
EKG QTC CALCULATION (BAZETT): 470 MS
EKG R AXIS: 15 DEGREES
EKG R AXIS: 18 DEGREES
EKG R AXIS: 8 DEGREES
EKG T AXIS: -121 DEGREES
EKG T AXIS: -141 DEGREES
EKG T AXIS: -157 DEGREES
EKG VENTRICULAR RATE: 60 BPM
EKG VENTRICULAR RATE: 63 BPM
EKG VENTRICULAR RATE: 79 BPM
EOSINOPHIL # BLD: 2.7 %
EOSINOPHILS ABSOLUTE: 0.2 THOU/MM3 (ref 0–0.4)
ERYTHROCYTE [DISTWIDTH] IN BLOOD BY AUTOMATED COUNT: 13.3 % (ref 11.5–14.5)
ERYTHROCYTE [DISTWIDTH] IN BLOOD BY AUTOMATED COUNT: 13.6 % (ref 11.5–14.5)
ERYTHROCYTE [DISTWIDTH] IN BLOOD BY AUTOMATED COUNT: 48.8 FL (ref 35–45)
ERYTHROCYTE [DISTWIDTH] IN BLOOD BY AUTOMATED COUNT: 51.3 FL (ref 35–45)
GFR SERPL CREATININE-BSD FRML MDRD: 49 ML/MIN/1.73M2
GLUCOSE BLD-MCNC: 126 MG/DL (ref 70–108)
HCT VFR BLD CALC: 36.4 % (ref 37–47)
HCT VFR BLD CALC: 37.8 % (ref 37–47)
HEMOGLOBIN: 12 GM/DL (ref 12–16)
HEMOGLOBIN: 12.4 GM/DL (ref 12–16)
IMMATURE GRANS (ABS): 0.02 THOU/MM3 (ref 0–0.07)
IMMATURE GRANULOCYTES: 0.3 %
LV EF: 58 %
LVEF MODALITY: NORMAL
LYMPHOCYTES # BLD: 20.1 %
LYMPHOCYTES ABSOLUTE: 1.3 THOU/MM3 (ref 1–4.8)
MAGNESIUM: 1.7 MG/DL (ref 1.6–2.4)
MCH RBC QN AUTO: 33.2 PG (ref 26–33)
MCH RBC QN AUTO: 33.9 PG (ref 26–33)
MCHC RBC AUTO-ENTMCNC: 32.8 GM/DL (ref 32.2–35.5)
MCHC RBC AUTO-ENTMCNC: 33 GM/DL (ref 32.2–35.5)
MCV RBC AUTO: 100.8 FL (ref 81–99)
MCV RBC AUTO: 103.3 FL (ref 81–99)
MONOCYTES # BLD: 8.5 %
MONOCYTES ABSOLUTE: 0.6 THOU/MM3 (ref 0.4–1.3)
NUCLEATED RED BLOOD CELLS: 0 /100 WBC
OSMOLALITY CALCULATION: 284.7 MOSMOL/KG (ref 275–300)
PLATELET # BLD: 193 THOU/MM3 (ref 130–400)
PLATELET # BLD: 199 THOU/MM3 (ref 130–400)
PMV BLD AUTO: 10.5 FL (ref 9.4–12.4)
PMV BLD AUTO: 11 FL (ref 9.4–12.4)
POTASSIUM SERPL-SCNC: 3.6 MEQ/L (ref 3.5–5.2)
RBC # BLD: 3.61 MILL/MM3 (ref 4.2–5.4)
RBC # BLD: 3.66 MILL/MM3 (ref 4.2–5.4)
SEG NEUTROPHILS: 67.8 %
SEGMENTED NEUTROPHILS ABSOLUTE COUNT: 4.5 THOU/MM3 (ref 1.8–7.7)
SODIUM BLD-SCNC: 141 MEQ/L (ref 135–145)
TOTAL PROTEIN: 5.8 G/DL (ref 6.1–8)
TROPONIN T: 0.03 NG/ML
TROPONIN T: 0.04 NG/ML
TROPONIN T: 0.04 NG/ML
TROPONIN T: < 0.01 NG/ML
TSH SERPL DL<=0.05 MIU/L-ACNC: 2.28 UIU/ML (ref 0.4–4.2)
WBC # BLD: 5.1 THOU/MM3 (ref 4.8–10.8)
WBC # BLD: 6.7 THOU/MM3 (ref 4.8–10.8)

## 2021-10-22 PROCEDURE — 36415 COLL VENOUS BLD VENIPUNCTURE: CPT

## 2021-10-22 PROCEDURE — 99222 1ST HOSP IP/OBS MODERATE 55: CPT | Performed by: INTERNAL MEDICINE

## 2021-10-22 PROCEDURE — 85025 COMPLETE CBC W/AUTO DIFF WBC: CPT

## 2021-10-22 PROCEDURE — 99223 1ST HOSP IP/OBS HIGH 75: CPT | Performed by: NURSE PRACTITIONER

## 2021-10-22 PROCEDURE — 6370000000 HC RX 637 (ALT 250 FOR IP): Performed by: NURSE PRACTITIONER

## 2021-10-22 PROCEDURE — 93010 ELECTROCARDIOGRAM REPORT: CPT | Performed by: NUCLEAR MEDICINE

## 2021-10-22 PROCEDURE — 6360000002 HC RX W HCPCS: Performed by: NURSE PRACTITIONER

## 2021-10-22 PROCEDURE — 93306 TTE W/DOPPLER COMPLETE: CPT

## 2021-10-22 PROCEDURE — 71045 X-RAY EXAM CHEST 1 VIEW: CPT

## 2021-10-22 PROCEDURE — 99285 EMERGENCY DEPT VISIT HI MDM: CPT

## 2021-10-22 PROCEDURE — 85027 COMPLETE CBC AUTOMATED: CPT

## 2021-10-22 PROCEDURE — 6370000000 HC RX 637 (ALT 250 FOR IP): Performed by: INTERNAL MEDICINE

## 2021-10-22 PROCEDURE — 71275 CT ANGIOGRAPHY CHEST: CPT

## 2021-10-22 PROCEDURE — 80053 COMPREHEN METABOLIC PANEL: CPT

## 2021-10-22 PROCEDURE — 83735 ASSAY OF MAGNESIUM: CPT

## 2021-10-22 PROCEDURE — 84484 ASSAY OF TROPONIN QUANT: CPT

## 2021-10-22 PROCEDURE — 6360000004 HC RX CONTRAST MEDICATION: Performed by: PHYSICIAN ASSISTANT

## 2021-10-22 PROCEDURE — 85730 THROMBOPLASTIN TIME PARTIAL: CPT

## 2021-10-22 PROCEDURE — 93005 ELECTROCARDIOGRAM TRACING: CPT | Performed by: PHYSICIAN ASSISTANT

## 2021-10-22 PROCEDURE — 93005 ELECTROCARDIOGRAM TRACING: CPT | Performed by: NURSE PRACTITIONER

## 2021-10-22 PROCEDURE — 6370000000 HC RX 637 (ALT 250 FOR IP): Performed by: PHYSICIAN ASSISTANT

## 2021-10-22 PROCEDURE — 84443 ASSAY THYROID STIM HORMONE: CPT

## 2021-10-22 PROCEDURE — 2500000003 HC RX 250 WO HCPCS: Performed by: PHYSICIAN ASSISTANT

## 2021-10-22 PROCEDURE — 1200000003 HC TELEMETRY R&B

## 2021-10-22 RX ORDER — NITROGLYCERIN 0.4 MG/1
0.4 TABLET SUBLINGUAL EVERY 5 MIN PRN
Status: DISCONTINUED | OUTPATIENT
Start: 2021-10-22 | End: 2021-10-23

## 2021-10-22 RX ORDER — PREDNISONE 1 MG/1
1 TABLET ORAL 2 TIMES DAILY WITH MEALS
Status: DISCONTINUED | OUTPATIENT
Start: 2021-10-22 | End: 2021-10-24 | Stop reason: HOSPADM

## 2021-10-22 RX ORDER — LOPERAMIDE HYDROCHLORIDE 2 MG/1
2 CAPSULE ORAL 4 TIMES DAILY PRN
Status: DISCONTINUED | OUTPATIENT
Start: 2021-10-22 | End: 2021-10-24 | Stop reason: HOSPADM

## 2021-10-22 RX ORDER — ASPIRIN 81 MG/1
81 TABLET, CHEWABLE ORAL DAILY
Status: DISCONTINUED | OUTPATIENT
Start: 2021-10-22 | End: 2021-10-24 | Stop reason: HOSPADM

## 2021-10-22 RX ORDER — ONDANSETRON 2 MG/ML
4 INJECTION INTRAMUSCULAR; INTRAVENOUS EVERY 6 HOURS PRN
Status: DISCONTINUED | OUTPATIENT
Start: 2021-10-22 | End: 2021-10-24 | Stop reason: HOSPADM

## 2021-10-22 RX ORDER — NITROGLYCERIN 20 MG/100ML
5-200 INJECTION INTRAVENOUS CONTINUOUS
Status: DISCONTINUED | OUTPATIENT
Start: 2021-10-22 | End: 2021-10-23

## 2021-10-22 RX ORDER — CETIRIZINE HYDROCHLORIDE 10 MG/1
10 TABLET ORAL DAILY
Status: DISCONTINUED | OUTPATIENT
Start: 2021-10-22 | End: 2021-10-24 | Stop reason: HOSPADM

## 2021-10-22 RX ORDER — HEPARIN SODIUM 1000 [USP'U]/ML
60 INJECTION, SOLUTION INTRAVENOUS; SUBCUTANEOUS ONCE
Status: DISCONTINUED | OUTPATIENT
Start: 2021-10-22 | End: 2021-10-22

## 2021-10-22 RX ORDER — HEPARIN SODIUM 1000 [USP'U]/ML
60 INJECTION, SOLUTION INTRAVENOUS; SUBCUTANEOUS PRN
Status: DISCONTINUED | OUTPATIENT
Start: 2021-10-23 | End: 2021-10-22

## 2021-10-22 RX ORDER — ACETAMINOPHEN 650 MG/1
650 SUPPOSITORY RECTAL EVERY 6 HOURS PRN
Status: DISCONTINUED | OUTPATIENT
Start: 2021-10-22 | End: 2021-10-24 | Stop reason: HOSPADM

## 2021-10-22 RX ORDER — ACETAMINOPHEN 325 MG/1
650 TABLET ORAL EVERY 6 HOURS PRN
Status: DISCONTINUED | OUTPATIENT
Start: 2021-10-22 | End: 2021-10-24 | Stop reason: HOSPADM

## 2021-10-22 RX ORDER — POTASSIUM CHLORIDE 20 MEQ/1
40 TABLET, EXTENDED RELEASE ORAL PRN
Status: DISCONTINUED | OUTPATIENT
Start: 2021-10-22 | End: 2021-10-24 | Stop reason: HOSPADM

## 2021-10-22 RX ORDER — HEPARIN SODIUM 1000 [USP'U]/ML
4000 INJECTION, SOLUTION INTRAVENOUS; SUBCUTANEOUS PRN
Status: DISCONTINUED | OUTPATIENT
Start: 2021-10-23 | End: 2021-10-23

## 2021-10-22 RX ORDER — ATORVASTATIN CALCIUM 20 MG/1
20 TABLET, FILM COATED ORAL NIGHTLY
Status: DISCONTINUED | OUTPATIENT
Start: 2021-10-22 | End: 2021-10-22

## 2021-10-22 RX ORDER — ALPRAZOLAM 0.5 MG/1
0.5 TABLET ORAL 3 TIMES DAILY
Status: DISCONTINUED | OUTPATIENT
Start: 2021-10-22 | End: 2021-10-24 | Stop reason: HOSPADM

## 2021-10-22 RX ORDER — HEPARIN SODIUM 10000 [USP'U]/100ML
5-30 INJECTION, SOLUTION INTRAVENOUS CONTINUOUS
Status: DISCONTINUED | OUTPATIENT
Start: 2021-10-22 | End: 2021-10-22

## 2021-10-22 RX ORDER — HEPARIN SODIUM 1000 [USP'U]/ML
2000 INJECTION, SOLUTION INTRAVENOUS; SUBCUTANEOUS PRN
Status: DISCONTINUED | OUTPATIENT
Start: 2021-10-23 | End: 2021-10-23

## 2021-10-22 RX ORDER — FLUTICASONE PROPIONATE 50 MCG
1 SPRAY, SUSPENSION (ML) NASAL DAILY PRN
Status: DISCONTINUED | OUTPATIENT
Start: 2021-10-22 | End: 2021-10-24 | Stop reason: HOSPADM

## 2021-10-22 RX ORDER — ZOLPIDEM TARTRATE 10 MG/1
10 TABLET ORAL NIGHTLY PRN
Status: DISCONTINUED | OUTPATIENT
Start: 2021-10-22 | End: 2021-10-24 | Stop reason: HOSPADM

## 2021-10-22 RX ORDER — PROMETHAZINE HYDROCHLORIDE 25 MG/1
25 TABLET ORAL EVERY 6 HOURS PRN
Status: DISCONTINUED | OUTPATIENT
Start: 2021-10-22 | End: 2021-10-24 | Stop reason: HOSPADM

## 2021-10-22 RX ORDER — FAMOTIDINE 20 MG/1
20 TABLET, FILM COATED ORAL 2 TIMES DAILY
Status: DISCONTINUED | OUTPATIENT
Start: 2021-10-22 | End: 2021-10-24 | Stop reason: HOSPADM

## 2021-10-22 RX ORDER — POLYETHYLENE GLYCOL 3350 17 G/17G
17 POWDER, FOR SOLUTION ORAL DAILY PRN
Status: DISCONTINUED | OUTPATIENT
Start: 2021-10-22 | End: 2021-10-24 | Stop reason: HOSPADM

## 2021-10-22 RX ORDER — HYDROCODONE BITARTRATE AND ACETAMINOPHEN 5; 325 MG/1; MG/1
1 TABLET ORAL 2 TIMES DAILY
Status: DISCONTINUED | OUTPATIENT
Start: 2021-10-22 | End: 2021-10-24 | Stop reason: HOSPADM

## 2021-10-22 RX ORDER — AMITRIPTYLINE HYDROCHLORIDE 10 MG/1
10 TABLET, FILM COATED ORAL NIGHTLY
Status: DISCONTINUED | OUTPATIENT
Start: 2021-10-22 | End: 2021-10-24 | Stop reason: HOSPADM

## 2021-10-22 RX ORDER — ATORVASTATIN CALCIUM 40 MG/1
40 TABLET, FILM COATED ORAL NIGHTLY
Status: DISCONTINUED | OUTPATIENT
Start: 2021-10-22 | End: 2021-10-24 | Stop reason: HOSPADM

## 2021-10-22 RX ORDER — HEPARIN SODIUM 10000 [USP'U]/100ML
5-30 INJECTION, SOLUTION INTRAVENOUS CONTINUOUS
Status: DISCONTINUED | OUTPATIENT
Start: 2021-10-23 | End: 2021-10-23

## 2021-10-22 RX ORDER — ONDANSETRON 4 MG/1
4 TABLET, ORALLY DISINTEGRATING ORAL EVERY 8 HOURS PRN
Status: DISCONTINUED | OUTPATIENT
Start: 2021-10-22 | End: 2021-10-24 | Stop reason: HOSPADM

## 2021-10-22 RX ORDER — SODIUM CHLORIDE 9 MG/ML
25 INJECTION, SOLUTION INTRAVENOUS PRN
Status: DISCONTINUED | OUTPATIENT
Start: 2021-10-22 | End: 2021-10-24 | Stop reason: HOSPADM

## 2021-10-22 RX ORDER — POTASSIUM CHLORIDE 7.45 MG/ML
10 INJECTION INTRAVENOUS PRN
Status: DISCONTINUED | OUTPATIENT
Start: 2021-10-22 | End: 2021-10-24 | Stop reason: HOSPADM

## 2021-10-22 RX ORDER — MAGNESIUM SULFATE IN WATER 40 MG/ML
2000 INJECTION, SOLUTION INTRAVENOUS PRN
Status: DISCONTINUED | OUTPATIENT
Start: 2021-10-22 | End: 2021-10-24 | Stop reason: HOSPADM

## 2021-10-22 RX ORDER — GABAPENTIN 300 MG/1
300 CAPSULE ORAL 3 TIMES DAILY
Status: DISCONTINUED | OUTPATIENT
Start: 2021-10-22 | End: 2021-10-24 | Stop reason: HOSPADM

## 2021-10-22 RX ORDER — NITROGLYCERIN 0.4 MG/1
0.4 TABLET SUBLINGUAL EVERY 5 MIN PRN
Status: DISCONTINUED | OUTPATIENT
Start: 2021-10-22 | End: 2021-10-24 | Stop reason: HOSPADM

## 2021-10-22 RX ORDER — SODIUM CHLORIDE 0.9 % (FLUSH) 0.9 %
5-40 SYRINGE (ML) INJECTION PRN
Status: DISCONTINUED | OUTPATIENT
Start: 2021-10-22 | End: 2021-10-24 | Stop reason: HOSPADM

## 2021-10-22 RX ORDER — DESVENLAFAXINE 100 MG/1
100 TABLET, EXTENDED RELEASE ORAL DAILY
Status: DISCONTINUED | OUTPATIENT
Start: 2021-10-22 | End: 2021-10-22

## 2021-10-22 RX ORDER — FOLIC ACID 1 MG/1
1 TABLET ORAL DAILY
Status: DISCONTINUED | OUTPATIENT
Start: 2021-10-22 | End: 2021-10-24 | Stop reason: HOSPADM

## 2021-10-22 RX ORDER — LISINOPRIL 10 MG/1
30 TABLET ORAL DAILY
Status: DISCONTINUED | OUTPATIENT
Start: 2021-10-22 | End: 2021-10-24 | Stop reason: HOSPADM

## 2021-10-22 RX ORDER — SODIUM CHLORIDE 0.9 % (FLUSH) 0.9 %
5-40 SYRINGE (ML) INJECTION EVERY 12 HOURS SCHEDULED
Status: DISCONTINUED | OUTPATIENT
Start: 2021-10-22 | End: 2021-10-24 | Stop reason: HOSPADM

## 2021-10-22 RX ADMIN — FAMOTIDINE 20 MG: 20 TABLET, FILM COATED ORAL at 21:01

## 2021-10-22 RX ADMIN — FAMOTIDINE 20 MG: 20 TABLET, FILM COATED ORAL at 13:46

## 2021-10-22 RX ADMIN — ALPRAZOLAM 0.5 MG: 0.5 TABLET ORAL at 22:58

## 2021-10-22 RX ADMIN — METOPROLOL TARTRATE 25 MG: 25 TABLET, FILM COATED ORAL at 13:46

## 2021-10-22 RX ADMIN — NITROGLYCERIN 5 MCG/MIN: 20 INJECTION INTRAVENOUS at 10:43

## 2021-10-22 RX ADMIN — NITROGLYCERIN 0.4 MG: 0.4 TABLET, ORALLY DISINTEGRATING SUBLINGUAL at 07:27

## 2021-10-22 RX ADMIN — CETIRIZINE HYDROCHLORIDE 10 MG: 10 TABLET, FILM COATED ORAL at 13:46

## 2021-10-22 RX ADMIN — GABAPENTIN 300 MG: 300 CAPSULE ORAL at 13:46

## 2021-10-22 RX ADMIN — NITROGLYCERIN 0.4 MG: 0.4 TABLET, ORALLY DISINTEGRATING SUBLINGUAL at 07:19

## 2021-10-22 RX ADMIN — GABAPENTIN 300 MG: 300 CAPSULE ORAL at 20:54

## 2021-10-22 RX ADMIN — IOPAMIDOL 80 ML: 755 INJECTION, SOLUTION INTRAVENOUS at 11:52

## 2021-10-22 RX ADMIN — ATORVASTATIN CALCIUM 40 MG: 40 TABLET, FILM COATED ORAL at 20:54

## 2021-10-22 RX ADMIN — AMITRIPTYLINE HYDROCHLORIDE 10 MG: 10 TABLET, FILM COATED ORAL at 20:54

## 2021-10-22 RX ADMIN — ENOXAPARIN SODIUM 70 MG: 80 INJECTION SUBCUTANEOUS at 13:46

## 2021-10-22 RX ADMIN — HYDROCODONE BITARTRATE AND ACETAMINOPHEN 1 TABLET: 5; 325 TABLET ORAL at 13:47

## 2021-10-22 RX ADMIN — TICAGRELOR 180 MG: 90 TABLET ORAL at 16:35

## 2021-10-22 RX ADMIN — ASPIRIN 81 MG CHEWABLE TABLET 81 MG: 81 TABLET CHEWABLE at 13:46

## 2021-10-22 RX ADMIN — PREDNISONE 1 MG: 1 TABLET ORAL at 16:35

## 2021-10-22 RX ADMIN — LISINOPRIL 30 MG: 10 TABLET ORAL at 13:46

## 2021-10-22 RX ADMIN — METOPROLOL TARTRATE 25 MG: 25 TABLET, FILM COATED ORAL at 20:54

## 2021-10-22 RX ADMIN — HYDROCODONE BITARTRATE AND ACETAMINOPHEN 1 TABLET: 5; 325 TABLET ORAL at 21:01

## 2021-10-22 RX ADMIN — FOLIC ACID 1 MG: 1 TABLET ORAL at 13:46

## 2021-10-22 ASSESSMENT — PAIN SCALES - GENERAL
PAINLEVEL_OUTOF10: 5
PAINLEVEL_OUTOF10: 0
PAINLEVEL_OUTOF10: 0
PAINLEVEL_OUTOF10: 3
PAINLEVEL_OUTOF10: 3

## 2021-10-22 ASSESSMENT — PAIN DESCRIPTION - LOCATION
LOCATION: KNEE
LOCATION: CHEST
LOCATION: CHEST

## 2021-10-22 ASSESSMENT — ENCOUNTER SYMPTOMS
WHEEZING: 0
SHORTNESS OF BREATH: 1
DIARRHEA: 0
COLOR CHANGE: 0
SINUS PRESSURE: 0
ABDOMINAL PAIN: 0
VOMITING: 0
SINUS PAIN: 0
RHINORRHEA: 0
CHEST TIGHTNESS: 1
NAUSEA: 0
BACK PAIN: 0

## 2021-10-22 ASSESSMENT — PAIN - FUNCTIONAL ASSESSMENT: PAIN_FUNCTIONAL_ASSESSMENT: ACTIVITIES ARE NOT PREVENTED

## 2021-10-22 ASSESSMENT — PAIN DESCRIPTION - FREQUENCY
FREQUENCY: CONTINUOUS

## 2021-10-22 ASSESSMENT — PAIN DESCRIPTION - PAIN TYPE
TYPE: ACUTE PAIN
TYPE: ACUTE PAIN
TYPE: CHRONIC PAIN

## 2021-10-22 ASSESSMENT — PAIN DESCRIPTION - DESCRIPTORS
DESCRIPTORS: PRESSURE
DESCRIPTORS: PRESSURE
DESCRIPTORS: ACHING
DESCRIPTORS: PRESSURE

## 2021-10-22 ASSESSMENT — PAIN DESCRIPTION - ORIENTATION: ORIENTATION: RIGHT

## 2021-10-22 ASSESSMENT — PAIN DESCRIPTION - ONSET: ONSET: ON-GOING

## 2021-10-22 ASSESSMENT — PAIN DESCRIPTION - PROGRESSION: CLINICAL_PROGRESSION: NOT CHANGED

## 2021-10-22 NOTE — ED TRIAGE NOTES
Patient presents to the ED via with complaints of chest pain and shortness of breath that began around 0400. Patient states that she has a history of irregular heart rhythm which is what she thought she was having. She was given four 81 mg aspirin and one nitro tablet en route to ED which she states helped. Patient explains the pain as a pressure and rates it as a 4/10 in severity. EKG completed and labs collected at bedside.

## 2021-10-22 NOTE — CONSULTS
[facility]'s  Cardiology Consult      Patient:  Michael Schaffer  YOB: 1950    MRN: [id number]   Acct: [de-identified]     Primary Care Physician: Ashley Josue MD    REASON FOR CONSULT:    CP, mild elevated troponin     CHIEF COMPLAINT:    Chest pain     HISTORY OF PRESENT ILLNESS:    Michael Schaffer is a pleasant 66year old female patient with past medical history that includes:   Past Medical History:   Diagnosis Date    Arthritis     Chronic kidney disease     Displacement of lumbar intervertebral disc     GERD (gastroesophageal reflux disease)     Heart murmur Nov 2012    Hypertension     Irritable bowel syndrome     [facility]upus ([facility]     Movement disorder     Neuromuscular disorder ([facility]     lupus    Psychiatric problem     depression    Spinal stenosis     Spondylisthesis     Thyroid disease     Vasculitis ([facility] 04/2016    Dr. Vale Cano     Vasculitis [facility]) 2017   The patient presented to [facility] ER with chest pain. Today around 4 AM, she had sudden onset left sided, pressure like/heaviness chest pain, 8/10, non-radiating, now improved with NTG gtt. She was found to have elevated Troponin, started on Lovenox (given one dose). Chest CTA was negative for PE. Troponin was initially normal at <0.01, then trended up to 0.025>0.040>0.043. EKG revealed SR, LVH, deep TWI in inferolateral leads, borderline ST depressions in lateral leads. Cr 1.1. Hgb 12.4. CXR revealed no acute cardiopulmonary disease. Patient denies orthopnea, paroxysmal nocturnal dyspnea, palpitations, dizziness, syncope, recent weight gain or leg swelling. Echocardiogram in 2019 revealed an EF of 60-65%, grade I DD. On 12/2019, patient was admitted to [facility] for chest pain/elevated Troponin, suspected NSTEMI. LHC at that time revealed no obstructive CAD.      All labs, EKG's, diagnostic testing and images as well as cardiac cath, stress testing   were reviewed during this encounter    CARDIAC TESTING  Echo:   ECHO: Results for orders placed during the hospital encounter of 12/05/19    ECHO Complete 2D W Doppler W Color    Narrative  Transthoracic Echocardiography Report (TTE)    Demographics    Patient Name    Sanjana Nicholson  Gender               Female  J    MR #            [id number]        Race                     Ethnicity    Account #       [de-identified]        Room Number          0034    Accession       [id number]        Date of Study        12/05/2019  Number    Date of Birth   1950       Referring Physician  Jonathan Palacio MD    Age             78 year(s)       Naeem Crandall, Essie Hicks MD  Physician    Procedure    Type of Study    TTE procedure:ECHOCARDIOGRAM COMPLETE 2D W DOPPLER W COLOR. Procedure Date  Date: 12/05/2019 Start: 02:00 PM    Study Location: Bedside  Technical Quality: Limited visualization due to poor acoustical window. Indications:Chest pain. Additional Medical History: Former smoker, Hypotension, GERD, Hypertension,  Palpitations, Syncope, Abnormal EKG, Chronic kidney disease    Patient Status: Routine    Height: 62 inches Weight: 159 pounds BSA: 1.73 m^2 BMI: 29.08 kg/m^2    BP: 129/85 mmHg    Conclusions    Summary  eft ventricle size is normal.  Mild concentric left ventricular hypertrophy. There were no regional wall motion abnormalities. Ejection fraction is visually estimated in the range of 60% to 65%. Doppler parameters were consistent with abnormal left ventricular  relaxation (grade 1 diastolic dysfunction). Signature    ----------------------------------------------------------------  Electronically signed by Ara Palacio MD (Interpreting  physician) on 12/05/2019 at 04:03 PM  ----------------------------------------------------------------    Findings    Mitral Valve  Structurally normal mitral valve. Mild mitral regurgitation is present.     Aortic Valve  Structurally normal aortic valve. Mild aortic regurgitation is noted. The peak gradient across the aortic valve is calculated as 13 mmHg by  doppler. Tricuspid Valve  Tricuspid valve is structurally normal.  Mild tricuspid regurgitation. Pulmonic Valve  The pulmonic valve leaflets exhibited normal thickness, no calcification,  and normal cuspal separation. DOPPLER: The transpulmonic velocity was  within the normal range with no evidence for regurgitation. Left Atrium  Left atrial size was normal.    Left Ventricle  Left ventricle size is normal.  Mild concentric left ventricular hypertrophy. There were no regional wall motion abnormalities. Ejection fraction is visually estimated in the range of 60% to 65%. Doppler parameters were consistent with abnormal left ventricular  relaxation (grade 1 diastolic dysfunction). Right Atrium  Right atrial size was normal.    Right Ventricle  Normal right ventricular size and function. Pericardial Effusion  The pericardium was normal in appearance with no evidence of a pericardial  effusion. Pleural Effusion  No evidence of pleural effusion. Aorta / Great Vessels  -Aortic root dimension within normal limits.  -The Pulmonary artery is within normal limits. -IVC size is within normal limits with normal respiratory phasic changes.     M-Mode/2D Measurements & Calculations    LV Diastolic    LV Systolic Dimension: 2.4   AV Cusp Separation: 2 cmLA  Dimension: 3.7  cm                           Dimension: 3.6 cmAO Root  cm              LV Volume Diastolic: 58.5 ml Dimension: 3.5 cm  LV FS:35.1 %    LV Volume Systolic: 18.1 ml  LV PW           LV EDV/LV EDV Index: 08.1  Diastolic: 1.1  GT/72 U^9LQ ESV/LV ESV  cm              Index: 20.2 ml/12 m^2        RV Diastolic Dimension: 3.2  Septum          EF Calculated: 65.2 %        cm  Diastolic: 0.9  cm                                           LA/Aorta: 1.03    Doppler Measurements & Calculations    MV Peak E-Wave: 51.4 cm/s AV Peak Velocity: 177  LVOT Peak Velocity: 153  MV Peak A-Wave: 89.8 cm/s cm/s                   cm/s  MV E/A Ratio: 0.57        AV Peak Gradient:      LVOT Peak Gradient: 9  MV Peak Gradient: 1.06    12.53 mmHg             mmHg  mmHg    MV Deceleration Time: 254  msec                                             TR Velocity:195 cm/s  AV P1/2t: 852 msec     TR Gradient:15.21 mmHg  PV Peak Velocity: 46.3  MV E' Septal Velocity:                           cm/s  3.7 cm/s                                         PV Peak Gradient: 0.86  MV A' Septal Velocity:    AV DVI (Vmax):0.86     mmHg  6.3 cm/s  MV E' Lateral Velocity:  6.5 cm/s  MV A' Lateral Velocity:  8.4 cm/s  E/E' septal: 13.89  E/E' lateral: 7.91  MR Velocity: 535 cm/s    [email or web address] [id number]     Cath:12/2019       CORONARY ANGIOGRAM:  LEFT MAIN:  Patent without any significant obstruction. LAD:  Patent without any significant obstruction. Gives rise to a large  diagonal branch without any significant obstruction. LCX:  No significant obstruction. Gives rise to large OM1 and OM2  systems without any significant obstruction. RCA:  No significant obstruction. Gives rise to large PDA and PLB, both  without any significant obstruction. RCA is dominant.     LVEF is 55% to 60% with no regional wall motion abnormality. No  significant mitral regurgitation. Aortic valve is tricuspid. No  significant aneurysm or dissection is seen at the visualized portion of  the descending aorta. LV systolic pressure 735. LVEDP 18. No  significant LV to AO systolic gradient.     IMMEDIATE COMPLICATIONS:  None.     MEDICATIONS:  See EMR.     ACCESS:  Manual pressure was used for hemostasis.     ESTIMATED BLOOD LOSS:  Less than 50 mL.     SUMMARY:  No significant coronary artery disease; preserved LVEF; EDP  18.   Past Medical History:    Past Medical History: (IMODIUM) 2 MG capsule, Take 1 capsule by mouth 4 times daily as needed for Diarrhea (may take up to 6 capsules per day if needed.)  zolpidem (AMBIEN) 10 MG tablet, Take 10 mg by mouth nightly as needed for Sleep   ALPRAZolam (XANAX) 0.5 MG tablet, Take 0.5 mg by mouth 3 times daily. folic acid (FOLVITE) 1 MG tablet, Take 1 mg by mouth daily. fluticasone (CUTIVATE) 0.05 % cream, Apply topically 2 times daily as needed Apply topically 2 times daily as needed. clobetasol (TEMOVATE) 0.05 % cream, Apply topically daily as needed Apply topically 2 times daily. NONFORMULARY, Restless leg homeopathic 1 tab as needed  NONFORMULARY, Calcium with VIt D daily  [DISCONTINUED] desvenlafaxine succinate (PRISTIQ) 100 MG TB24 extended release tablet, Take by mouth daily  Methotrexate, PF, (RASUVO) 15 MG/0.3ML chemo injection pen, Inject 15 mg into the skin once a week  [DISCONTINUED] famotidine (PEPCID) 20 MG tablet, Take 20 mg by mouth 2 times daily  fexofenadine (ALLEGRA) 180 MG tablet, Take 180 mg by mouth as needed  NONFORMULARY, Take 250 mg by mouth as needed Acetoimophen-aspirin takes as needed  promethazine (PHENERGAN) 25 MG tablet, Take 25 mg by mouth every 6 hours as needed for Nausea  Homeopathic Products (ZICAM COLD REMEDY PO), Take 1 tablet by mouth every 3 hours as needed   [DISCONTINUED] omeprazole (PRILOSEC) 40 MG capsule, Take 40 mg by mouth as needed   fluticasone (FLONASE) 50 MCG/ACT nasal spray, 1 spray by Nasal route daily as needed. Allergies:    Ciprofloxacin, Levaquin [levofloxacin], Morphine, Plaquenil [hydroxychloroquine], and Zofran [ondansetron]    Social History:    reports that she has quit smoking. She has a 10.00 pack-year smoking history. She has never used smokeless tobacco. She reports that she does not drink alcohol and does not use drugs.     Family History:   family history includes Arthritis in her maternal grandmother; Asthma in her brother; Cancer in her mother; Heart Disease in her brother and father; High Blood Pressure in her brother. REVIEW OF SYSTEMS:  Constitutional: negative for anorexia, chills and fevers,weight change  Skin: negative for new skin rash per patient  HEENT: negative for head trauma or new visual changes  Respiratory: negative for cough, hemoptysis, wheezing  Cardiovascular: negative for  orthopnea, palpitations and syncope. Gastrointestinal: negative for abdominal pain,nausea , vomiting, constipation, diarrhea. Hematologic/lymphatic: negative for bruising,prolonged bleeding,blood clots  Musculoskeletal:negative for muscle weakness, myalgias,wasting  Neurological: negative for coordination problems, dizziness, gait problems and vertigo  Behavioral/Psych:negative for mood/sleep disturbance      PHYSICAL EXAM:   Vitals:  Patient Vitals for the past 24 hrs:   BP Temp Temp src Pulse Resp SpO2 Height Weight   10/22/21 1200 (!) 168/90 97.7 °F (36.5 °C) Oral 69 16 100 % -- --   10/22/21 1120 (!) 165/79 -- -- 58 13 -- -- --   10/22/21 1041 (!) 154/83 -- -- 60 18 99 % -- --   10/22/21 0931 (!) 141/76 -- -- 62 16 98 % -- --   10/22/21 0819 (!) 174/89 -- -- 65 18 95 % -- --   10/22/21 0726 130/78 -- -- 75 18 96 % -- --   10/22/21 0717 (!) 156/74 -- -- 71 18 96 % -- --   10/22/21 0559 (!) 159/91 98.1 °F (36.7 °C) -- 80 19 95 % 5' 2\" (1.575 m) 160 lb (72.6 kg)       Last 3 weights: Wt Readings from Last 3 Encounters:   10/22/21 160 lb (72.6 kg)   09/17/20 162 lb (73.5 kg)   01/09/20 161 lb 12.8 oz (73.4 kg)     24 hour intake/output:No intake or output data in the 24 hours ending 10/22/21 1404  BMI:Body mass index is 29.26 kg/m².     General Appearance: alert and oriented to person, place and time, well developed and well- nourished, in no acute distress  Skin: warm and dry, no rash or erythema  Eyes: pupils equal, round, and reactive to light, extraocular eye movements intact, conjunctivae normal  Neck: supple and non-tender without mass, no thyromegaly or thyroid nodules, no cervical lymphadenopathy  Pulmonary/Chest: clear to auscultation bilaterally- no wheezes, rales or rhonchi, normal air movement, no respiratory distress  Cardiovascular: normal rate, regular rhythm, normal S1 and S2, no murmur. No rubs, clicks, or gallops, distal pulses intact, no carotid bruits, Negative JVD  Radial Pulses: intact 2+  Abdomen: soft, non-tender, non-distended, normal bowel sounds, no masses or organomegaly  Extremities: no cyanosis, clubbing . No Edema  Musculoskeletal: normal range of motion, no joint swelling, deformity or tenderness      RADIOLOGY   CTA CHEST W WO CONTRAST    Result Date: 10/22/2021  PROCEDURE: CTA CHEST W WO CONTRAST CLINICAL INFORMATION: chest pain; hx lupus; concerned for PE/dissection per Dr Zazueta Doctor COMPARISON: Chest radiograph 10/22/2021 TECHNIQUE: CT angiography of the chest was performed with intravenous contrast. Pulmonary embolus in protocol was used. Multiplanar reformats are provided. All CT scans at this facility use dose modulation, iterative reconstruction, and/or weight based dosing when appropriate to reduce the radiation dose to as low as reasonably achievable. CONTRAST: 80 mL of Isovue-370 FINDINGS: There is adequate opacification of the main pulmonary artery and its branches. . No CT evidence of pulmonary embolus . A bovine aortic arch is seen. Subsegmental atelectasis or scarring is seen in the lung bases, the right middle lobe and lingula. Biapical pleural scarring. Aortic calcifications. No focal pulmonary consolidation. No large pulmonary mass. Central airway is patent. No pleural effusions. No significant pericardial effusion. The heart is at upper limits of normal in size. Ascending thoracic aorta is not dilated. The main pulmonary artery is not dilated. No significantly enlarged mediastinal or  axillary lymph node. The thyroid is not enlarged. No chest wall mass. Limited evaluation below the diaphragm is unremarkable.  Bones: Degenerative changes of the thoracic spine. Degenerative changes of the acromioclavicular and glenohumeral joints. .     1. No CT evidence of pulmonary embolus. 2. Subsegmental atelectasis and/or scarring in the lung bases, right middle lobe and lingula. 3. No thoracic aortic dissection. **This report has been created using voice recognition software. It may contain minor errors which are inherent in voice recognition technology. ** Final report electronically signed by Dr Adelaide Westfall on 10/22/2021 12:23 PM    XR CHEST PORTABLE    Result Date: 10/22/2021  PROCEDURE: XR CHEST PORTABLE CLINICAL INFORMATION: CP/SOB . TECHNIQUE: Portable upright COMPARISON: 12/5/2019 FINDINGS: The heart is borderline in size. Mediastinum is not widened. No infiltrates or effusions. Vascularity is normal. EKG leads overlie the chest.     Borderline heart size no acute cardiopulmonary disease. **This report has been created using voice recognition software. It may contain minor errors which are inherent in voice recognition technology. ** Final report electronically signed by Dr. Marco A Courtney on 10/22/2021 7:02 AM      LABS:  Recent Labs     10/22/21  0849 10/22/21  1039 10/22/21  1315   TROPONINT 0.025* 0.040* 0.043*     CBC:   Lab Results   Component Value Date    WBC 6.7 10/22/2021    RBC 3.61 10/22/2021    RBC 4.16 04/18/2012    HGB 12.0 10/22/2021    HCT 36.4 10/22/2021    .8 10/22/2021    MCH 33.2 10/22/2021    MCHC 33.0 10/22/2021    RDW 14.1 06/11/2018     10/22/2021    MPV 11.0 10/22/2021     BMP:    Lab Results   Component Value Date     10/22/2021    K 3.6 10/22/2021    K 3.9 12/06/2019     10/22/2021    CO2 27 10/22/2021    BUN 18 10/22/2021    LABALBU 3.8 10/22/2021    LABALBU 3.9 04/14/2012    CREATININE 1.1 10/22/2021    CALCIUM 9.0 10/22/2021    LABGLOM 49 10/22/2021    GLUCOSE 126 10/22/2021     Hepatic Function Panel:    Lab Results   Component Value Date    ALKPHOS 79 10/22/2021    ALT 45 10/22/2021    AST 40 10/22/2021    PROT 5.8 10/22/2021    BILITOT 0.3 10/22/2021    BILIDIR <0.2 12/05/2019    LABALBU 3.8 10/22/2021    LABALBU 3.9 04/14/2012     Magnesium:    Lab Results   Component Value Date    MG 1.7 10/22/2021     Warfarin PT/INR:  No components found for: PTPATWAR, PTINRWAR  HgBA1c:  No results found for: LABA1C  FLP:    Lab Results   Component Value Date    TRIG 79 12/06/2019    HDL 50 12/06/2019    LDLCALC 108 12/06/2019     TSH:    Lab Results   Component Value Date    TSH 2.280 10/22/2021     BNP: No results found for: BNP      ASSESSMENT:  1. Chest pain  2. Elevated Troponin   3. Abnormal EKG   4. NSTEMI  5. H/O SLE   6. H/o vasculitis   7. Hypertension     RECOMMENDATIONS:  · She presented sudden onset left sided, pressure like/heaviness chest pain   · She has known history of SLE, vasculitis   · Chest CTA was negative for PE  · Her troponin was normal   · Troponin was initially normal at <0.01, then trended up to 0.025>0.040>0. 043  · Ischemic changes noted on EKG  · EKG revealed SR, LVH, deep TWI in inferolateral leads, borderline ST depressions in lateral leads  · Chest pain resolved with NTG gtt  · Titrate NTG gtt as needed to chest pain   · Treat as NSTEMI   · She was started on Lovenox (given one dose)  · Stop Lovenox  · Change to low dose Heparin gtt (start 12 hours after last dose of Lovenox)  · Arrange for LHC in am   · On 12/2019, patient was admitted to [facility] for chest pain/elevated Troponin, suspected NSTEMI. LHC at that time revealed no obstructive CAD  · Start Birlinta   · ASA  · High intensity statin   · Metoprolol  · Telemetry   · Echocardiogram today     Above findings and plan of care were discussed with patient and her family, questions were answered, agreeable to plan. Thank you for allowing me to participate in the care of this patient. Please let me know if I can be of any further assistance.       Tana Stein MD, [facility]   2:04 PM  10/22/2021

## 2021-10-22 NOTE — ED NOTES
Pt resting on cot with eyes closed. Pt respirations even and unlabored.      May Irving RN  10/22/21 [id number]

## 2021-10-22 NOTE — ED NOTES
Bedside report received from Brian Floss, [place]. Pt resting on cot with family at bedside. Pt states chest pain is better since receiving the nitro in route to the ED.      Bryant Araiza RN  10/22/21 [id number]

## 2021-10-22 NOTE — ED PROVIDER NOTES
[facility]  eMERGENCY dEPARTMENT eNCOUnter          [address] [facility]       Chief Complaint   Patient presents with    Chest Pain    Shortness of Breath       Nurses Notes reviewed and I agree except as noted in the HPI. HISTORY OF PRESENT ILLNESS    Grace Uriostegui is a 66 y.o. female with a PMHx of Lupus with Raynauds, h/o Cardiac Cath, NSTEMI, and GERD who presents to the Emergency Department for the evaluation of acute onset chest pain. Symptom onset was at 0400 this morning. Patient was up watching television and noticed the chest pain when she went to lay down to rest. The chest pain is located substernal with no radiation. No improvement with positional changes. She describes the pain as a constant \"pressure / burning\" sensation. Her chest pain is associated with SOB and palpitations. She denies any treatment at home and denies any aggravating factors. She checked her BP at home and noted that her BP was labile with variable readings at home. EMS administered 325 mg ASA and 0.4 mg of Sublingual NTG prior to arrival. This did improve her chest pain from 9/10 to 4/10. EMS also reported to patient that she did have atrial fibrillation shown on the monitor en route. She denies previous h/o a-fib. Patient currently following with Loco Galvez for Cardiology concerns. She denies any dizziness, headaches, light-headedness, N/V. No prior history of bypass surgery/stents. Reports heart catheterization in 2019 showed \"heart as healthy as a 12year old. \"           The HPI was provided by the patient. REVIEW OF SYSTEMS     Review of Systems   Constitutional: Negative for activity change, appetite change, fatigue and fever. HENT: Negative for congestion, rhinorrhea, sinus pressure and sinus pain. Respiratory: Positive for chest tightness and shortness of breath. Negative for wheezing. Cardiovascular: Positive for chest pain and palpitations. Negative for leg swelling. Gastrointestinal: Negative for abdominal pain, diarrhea, nausea and vomiting. Musculoskeletal: Negative for arthralgias, back pain and joint swelling. Skin: Negative for color change, pallor, rash and wound. Allergic/Immunologic: Negative for environmental allergies, food allergies and immunocompromised state. H/o Lupus    Neurological: Negative for dizziness, weakness, light-headedness and headaches. Hematological: Negative for adenopathy. Does not bruise/bleed easily. Psychiatric/Behavioral: Negative for behavioral problems, decreased concentration and dysphoric mood. PAST MEDICAL HISTORY    has a past medical history of Arthritis, Chronic kidney disease, Displacement of lumbar intervertebral disc, GERD (gastroesophageal reflux disease), Heart murmur, Hypertension, Irritable bowel syndrome, Lupus ([address] Movement disorder, Neuromuscular disorder ([address] Psychiatric problem, Spinal stenosis, Spondylisthesis, Thyroid disease, Vasculitis ([address] and Vasculitis ([address] SURGICAL HISTORY      has a past surgical history that includes Bunionectomy (@ 0735-1014); parathyroidectomy; Hysterectomy (1987); Appendectomy (1987); other surgical history (11/26/2012); Abdomen surgery; back surgery; Colonoscopy; and Endoscopy, colon, diagnostic. CURRENT MEDICATIONS       Current Discharge Medication List      CONTINUE these medications which have NOT CHANGED    Details   atorvastatin (LIPITOR) 20 MG tablet TAKE 1 TABLET BY MOUTH DAILY  Qty: 90 tablet, Refills: 0      metoprolol tartrate (LOPRESSOR) 25 MG tablet TAKE 1 TABLET BY MOUTH TWICE DAILY  Qty: 180 tablet, Refills: 0      lisinopril (PRINIVIL;ZESTRIL) 30 MG tablet TAKE 1 TABLET BY MOUTH EVERY DAY  Qty: 90 tablet, Refills: 0      nitroGLYCERIN (NITROSTAT) 0.4 MG SL tablet up to max of 3 total doses. If no relief after 1 dose, call 911. Qty: 25 tablet, Refills: 3      gabapentin (NEURONTIN) 300 MG capsule Take 300 mg by mouth 3 times daily. amitriptyline (ELAVIL) 10 MG tablet Take 10 mg by mouth nightly      methotrexate (RHEUMATREX) 2.5 MG chemo tablet Take 2.5 mg by mouth once a week Take 5 tablets weekly      Multiple Vitamins-Minerals (MULTIVITAMIN GUMMIES WOMENS PO) Take by mouth 1 daily      L-Arginine 500 MG TABS Take 500 mg by mouth daily      predniSONE (DELTASONE) 1 MG tablet Take 1 mg by mouth 2 times daily       HYDROcodone-acetaminophen (NORCO) 5-325 MG per tablet Take 1 tablet by mouth 2 times daily. aspirin 81 MG chewable tablet Take 81 mg by mouth daily      loperamide (IMODIUM) 2 MG capsule Take 1 capsule by mouth 4 times daily as needed for Diarrhea (may take up to 6 capsules per day if needed.)  Qty: 60 capsule, Refills: 2      zolpidem (AMBIEN) 10 MG tablet Take 10 mg by mouth nightly as needed for Sleep       ALPRAZolam (XANAX) 0.5 MG tablet Take 0.5 mg by mouth 3 times daily. folic acid (FOLVITE) 1 MG tablet Take 1 mg by mouth daily. fluticasone (CUTIVATE) 0.05 % cream Apply topically 2 times daily as needed Apply topically 2 times daily as needed. clobetasol (TEMOVATE) 0.05 % cream Apply topically daily as needed Apply topically 2 times daily. !! NONFORMULARY Restless leg homeopathic 1 tab as needed      !! NONFORMULARY Calcium with VIt D daily      Methotrexate, PF, (RASUVO) 15 MG/0.3ML chemo injection pen Inject 15 mg into the skin once a week      fexofenadine (ALLEGRA) 180 MG tablet Take 180 mg by mouth as needed      !! NONFORMULARY Take 250 mg by mouth as needed Acetoimophen-aspirin takes as needed      promethazine (PHENERGAN) 25 MG tablet Take 25 mg by mouth every 6 hours as needed for Nausea      Homeopathic Products (ZICAM COLD REMEDY PO) Take 1 tablet by mouth every 3 hours as needed       fluticasone (FLONASE) 50 MCG/ACT nasal spray 1 spray by Nasal route daily as needed. !! - Potential duplicate medications found. Please discuss with provider.           ALLERGIES     is allergic to ciprofloxacin, levaquin [levofloxacin], morphine, plaquenil [hydroxychloroquine], and zofran [ondansetron]. FAMILY HISTORY     She indicated that her mother is . She indicated that her father is . She indicated that the status of her brother is unknown. She indicated that the status of her maternal grandmother is unknown.   family history includes Arthritis in her maternal grandmother; Asthma in her brother; Cancer in her mother; Heart Disease in her brother and father; High Blood Pressure in her brother. SOCIAL HISTORY      reports that she has quit smoking. She has a 10.00 pack-year smoking history. She has never used smokeless tobacco. She reports that she does not drink alcohol and does not use drugs. PHYSICAL EXAM     INITIAL VITALS:  height is 5' 2\" (1.575 m) and weight is 160 lb (72.6 kg). Her oral temperature is 97.7 °F (36.5 °C). Her blood pressure is 168/90 (abnormal) and her pulse is 69. Her respiration is 16 and oxygen saturation is 100%. Physical Exam  Constitutional:       General: She is not in acute distress. Appearance: She is well-developed. She is not ill-appearing, toxic-appearing or diaphoretic. HENT:      Head: Normocephalic and atraumatic. Mouth/Throat:      Mouth: Mucous membranes are moist.   Eyes:      Extraocular Movements: Extraocular movements intact. Cardiovascular:      Rate and Rhythm: Normal rate and regular rhythm. No extrasystoles are present. Pulses: Normal pulses. No decreased pulses. Heart sounds: Normal heart sounds. No murmur heard. No friction rub. No gallop. Pulmonary:      Effort: Pulmonary effort is normal. No tachypnea, bradypnea, accessory muscle usage or respiratory distress. Breath sounds: Normal breath sounds. No stridor. No decreased breath sounds, wheezing, rhonchi or rales. Chest:      Chest wall: No mass, deformity, tenderness, crepitus or edema. There is no dullness to percussion.       Comments: No palpable, reproducible chest tenderness. Abdominal:      Tenderness: There is no abdominal tenderness. There is no rebound. Musculoskeletal:         General: Normal range of motion. Cervical back: Normal range of motion. Right lower leg: No tenderness. No edema. Left lower leg: No tenderness. No edema. Lymphadenopathy:      Cervical: No cervical adenopathy. Skin:     General: Skin is warm and dry. Coloration: Skin is not cyanotic or pale. Findings: No ecchymosis, erythema or rash. Nails: There is no clubbing. Neurological:      General: No focal deficit present. Mental Status: She is alert and oriented to person, place, and time. Psychiatric:         Mood and Affect: Mood normal. Mood is not anxious. Behavior: Behavior normal. Behavior is not agitated. DIFFERENTIAL DIAGNOSIS:   STEMI, NSTEMI, Stable Angina, Unstable Angina, GERD    DIAGNOSTIC RESULTS     EKG: All EKG's are interpreted by the Emergency Department Physician who either signs or Co-signsthis chart in the absence of a cardiologist.    Nathalie Charles. Rate: 79 bpm  PRinterval: 164 ms  QRS duration: 82 ms  QTc: 470 ms  P-R-T axes: 51, 18, - 141    NSR; Possible Left Atrial Enlargement   LVH with Repolarization Abnormality   ST Depression in Anterior Leads   Inverted T Waves in Inferior Leads and Anterior Leads   Compared to old EKG on 12-    Repeat EKG    Vent. Rate: 60 bpm   PRinterval: 166 ms   QRS duration: 78 ms   QTc: 460   P-R-T axes: 79, 15, -121   NSR; Moderate voltage criteria for LVH, may be normal variant   ST & Marked T-wave abnormality, consider inferolateral ischemia   Abnormal ECG   Compared to old EKG on 10-        RADIOLOGY: non-plain film images(s) such as CT, Ultrasound and MRI are read by the radiologist.    CTA CHEST [address]   Final Result      1. No CT evidence of pulmonary embolus.       2. Subsegmental atelectasis and/or scarring in the lung bases, right middle lobe and lingula. 3. No thoracic aortic dissection. **This report has been created using voice recognition software. It may contain minor errors which are inherent in voice recognition technology. **      Final report electronically signed by Dr Romana Romney on 10/22/2021 12:23 PM      XR CHEST PORTABLE   Final Result   Borderline heart size no acute cardiopulmonary disease. **This report has been created using voice recognition software. It may contain minor errors which are inherent in voice recognition technology. **      Final report electronically signed by Dr. Magda Figueroa on 10/22/2021 7:02 AM          LABS:      Labs Reviewed   CBC WITH AUTO DIFFERENTIAL - Abnormal; Notable for the following components:       Result Value    RBC 3.61 (*)     Hematocrit 36.4 (*)     .8 (*)     MCH 33.2 (*)     RDW-SD 48.8 (*)     All other components within normal limits   COMPREHENSIVE METABOLIC PANEL - Abnormal; Notable for the following components:    Glucose 126 (*)     Total Protein 5.8 (*)     All other components within normal limits   GLOMERULAR FILTRATION RATE, ESTIMATED - Abnormal; Notable for the following components:    Est, Glom Filt Rate 49 (*)     All other components within normal limits   TROPONIN - Abnormal; Notable for the following components:    Troponin T 0.025 (*)     All other components within normal limits   TROPONIN - Abnormal; Notable for the following components:    Troponin T 0.040 (*)     All other components within normal limits   TROPONIN - Abnormal; Notable for the following components:    Troponin T 0.043 (*)     All other components within normal limits   CBC - Abnormal; Notable for the following components:    RBC 3.66 (*)     .3 (*)     MCH 33.9 (*)     RDW-SD 51.3 (*)     All other components within normal limits   MAGNESIUM   ANION GAP   OSMOLALITY   TROPONIN   TSH WITH REFLEX   APTT   APTT   APTT       EMERGENCY DEPARTMENT COURSE:   Vitals: Vitals:    10/22/21 0931 10/22/21 1041 10/22/21 1120 10/22/21 1200   BP: (!) 141/76 (!) 154/83 (!) 165/79 (!) 168/90   Pulse: 62 60 58 69   Resp: 16 18 13 16   Temp:    97.7 °F (36.5 °C)   TempSrc:    Oral   SpO2: 98% 99%  100%   Weight:       Height:          6:49 AM EDT: The patient was seen and evaluated. Patient presents for complaint of chest pain with shortness of breath that began this morning. No prior cardiac history and heart catheterization a couple years ago showed no obstructive coronary artery disease. She was noted by EMS staff to have atrial fibrillation with rapid ventricular rate, ventricular rate of 112 per telemetry print out provided by EMS. She was noted to have sinus rhythm on arrival.  She presented with some new T wave changes in the lateral leads which were more pronounced on repeat EKG. Her initial troponin was within normal limits and pain near completely resolved after additional sublingual nitroglycerin in the department. Due to the EKG changes, we did discuss admission for further management and patient was agreeable. Repeat troponin in the department is upward trending in the absence of any persistent chest pain. IV nitroglycerin was ordered. Case discussed with the hospitalist service will admit the patient for further care and she denied further complaints at this time. CRITICAL CARE:   None    CONSULTS:  Hospitalist    PROCEDURES:  None    FINAL IMPRESSION      1. Chest pain, unspecified type          DISPOSITION/PLAN   Admit    PATIENT REFERRED TO:  No follow-up provider specified.     DISCHARGEMEDICATIONS:  Current Discharge Medication List          (Please note that portions of this note were completedwith a voice recognition program.  Efforts were made to edit the dictations but occasionally words are mis-transcribed.)        Yasmin Pichardo PA-C  10/22/21 [id number]

## 2021-10-22 NOTE — ED NOTES
Pt states chest pain remains 3/10 after first nitro. RN medicated per MAR. Pt respirations even and unlabored.       Avelino Vang RN  10/22/21 3473

## 2021-10-22 NOTE — H&P
History & Physical        Patient:  Michael Schaffer  YOB: 1950    MRN: [id number]     Acct: [de-identified]    PCP: Ashley Josue MD    Date of Admission: 10/22/2021    Date of Service: Pt seen/examined on 10/22/21  and Admitted to Inpatient with expected LOS greater than two midnights due to medical therapy. ASSESSMENT/PLAN:    1. Acute chest pain/possible NSTEMI--sent perfect serve message to Dr. Justo Gomez regarding consult, mild troponin elevation and EKG changes; on aspirin; Lovenox 1 mg/kg subcu twice a day; on statin; on nitroglycerin drip  2. EKG changes--please see #1  3. Essential hypertension, uncontrolled--lisinopril, beta-blocker; monitor  4. Chronic steroid use--1 mg twice a day of prednisone  5. Chronic diastolic heart failure    Chief Complaint: Chest pain    History Of Present Illness:    66 y.o. female who presented to [address] [place] [address] with chest pain; patient has a past medical history of migraine headaches, lupus along with hypertension; she follows with Dr. Shaan Castillo from cardiology; she had a cardiac catheterization done on December 6, 2019 that did not reveal significant coronary artery disease; her cardiogram from December 5, 2019 revealed an EF 60 to 65% with grade 1 diastolic dysfunction; she states this morning approximately 4 AM she developed midsternal chest pressure and states it was heavy in sensation, she denies any radiation, she does relate to shortness of breath, denies nausea, relates to fatigue; she initially rated her chest pressure 8 out of 10 to the mid chest and currently is pain-free; she states she has a history of migraine headaches and states she had a typical migraine headache for the last 4 days along with photophobia however improved today and rates 3 out of 10.     The emergency department, her initial troponin was normal however her second 1 did have mild bump in EKG changes were noted; upon my assessment she is currently chest pain-free and her only complaint is a mild headache rating 3 out of 10; she is being admitted to hospital service for further care and evaluation with a consult to cardiology. Past Medical History:          Diagnosis Date    Arthritis     Chronic kidney disease     Displacement of lumbar intervertebral disc     GERD (gastroesophageal reflux disease)     Heart murmur Nov 2012    Hypertension     Irritable bowel syndrome     [facility]     Movement disorder     Neuromuscular disorder ([facility] [address] [facility]     lupus    Psychiatric problem     depression    Spinal stenosis     Spondylisthesis     Thyroid disease     Vasculitis ([facility] [address] 04/2016    Dr. Samira Joy     Vasculitis [facility]) 2017       Past Surgical History:          Procedure Laterality Date    ABDOMEN SURGERY      APPENDECTOMY  1987   [facility]      BUNIONECTOMY  @ [id number]    COLONOSCOPY      ENDOSCOPY, COLON, DIAGNOSTIC      HYSTERECTOMY  1987    OTHER SURGICAL HISTORY  11/26/2012    [id number]. POSTEROLATERAL FUSION L5-S1 .POST. LUMBAR INTERBODY FUSION ILIAC BONE GRAFT AND LEGACY CAPSTONE    PARATHYROIDECTOMY         Medications Prior to Admission:      Prior to Admission medications    Medication Sig Start Date End Date Taking? Authorizing Provider   atorvastatin (LIPITOR) 20 MG tablet TAKE 1 TABLET BY MOUTH DAILY 9/29/21  Yes Rober Quesada MD   metoprolol tartrate (LOPRESSOR) 25 MG tablet TAKE 1 TABLET BY MOUTH TWICE DAILY 9/29/21  Yes Rober Quesada MD   lisinopril (PRINIVIL;ZESTRIL) 30 MG tablet TAKE 1 TABLET BY MOUTH EVERY DAY 5/3/21  Yes FRANCISCA Arias CNP   nitroGLYCERIN (NITROSTAT) 0.4 MG SL tablet up to max of 3 total doses. If no relief after 1 dose, call 911. 12/6/19  Yes Colleen Ventura MD   gabapentin (NEURONTIN) 300 MG capsule Take 300 mg by mouth 3 times daily.     Yes Historical Provider, MD   amitriptyline (ELAVIL) 10 MG tablet Take 10 mg by mouth nightly   Yes Historical Provider, MD   Methotrexate, PF, (RASUVO) 15 MG/0.3ML chemo injection pen Inject 15 mg into the skin once a week   Yes Historical Provider, MD   Multiple Vitamins-Minerals (MULTIVITAMIN GUMMIES WOMENS PO) Take by mouth 1 daily   Yes Historical Provider, MD   L-Arginine 500 MG TABS Take 500 mg by mouth daily   Yes Historical Provider, MD   predniSONE (DELTASONE) 1 MG tablet Take 1 mg by mouth 2 times daily    Yes Historical Provider, MD   HYDROcodone-acetaminophen (NORCO) 5-325 MG per tablet Take 1 tablet by mouth 2 times daily. Yes Historical Provider, MD   aspirin 81 MG chewable tablet Take 81 mg by mouth daily   Yes Historical Provider, MD   zolpidem (AMBIEN) 10 MG tablet Take 10 mg by mouth nightly as needed for Sleep    Yes Historical Provider, MD   ALPRAZolam (XANAX) 0.5 MG tablet Take 0.5 mg by mouth 3 times daily. Yes Alpesh Vega MD   folic acid (FOLVITE) 1 MG tablet Take 1 mg by mouth daily. Yes Historical Provider, MD   fluticasone (CUTIVATE) 0.05 % cream Apply topically 2 times daily as needed Apply topically 2 times daily as needed. Historical Provider, MD   clobetasol (TEMOVATE) 0.05 % cream Apply topically daily as needed Apply topically 2 times daily.     Historical Provider, MD   desvenlafaxine succinate (PRISTIQ) 100 MG TB24 extended release tablet Take by mouth daily    Historical Provider, MD   NONFORMULARY Restless leg homeopathic 1 tab as needed    Historical Provider, MD   NONFORMULARY Calcium with VIt D daily    Historical Provider, MD   famotidine (PEPCID) 20 MG tablet Take 20 mg by mouth 2 times daily    Historical Provider, MD   methotrexate (RHEUMATREX) 2.5 MG chemo tablet Take 2.5 mg by mouth once a week Take 5 tablets weekly    Historical Provider, MD   fexofenadine (ALLEGRA) 180 MG tablet Take 180 mg by mouth as needed    Historical Provider, MD   NONFORMULARY Take 250 mg by mouth as needed Acetoimophen-aspirin takes as needed    Historical Provider, MD   promethazine (PHENERGAN) 25 MG tablet Take 25 mg by mouth every 6 hours as needed for Nausea    Historical Provider, MD   loperamide (IMODIUM) 2 MG capsule Take 1 capsule by mouth 4 times daily as needed for Diarrhea (may take up to 6 capsules per day if needed.) 12/21/15   FRANCISCA Solares - CNP   [facility] COLD REMEDY PO) Take 1 tablet by mouth every 3 hours as needed     Historical Provider, MD   omeprazole (PRILOSEC) 40 MG capsule Take 40 mg by mouth as needed     Historical Provider, MD   fluticasone (FLONASE) 50 MCG/ACT nasal spray 1 spray by Nasal route daily as needed. Historical Provider, MD       Allergies:  Ciprofloxacin, Levaquin [levofloxacin], Morphine, Plaquenil [hydroxychloroquine], and Zofran [ondansetron]    Social History:   reports that she has quit smoking. She has a 10.00 pack-year smoking history. She has never used smokeless tobacco. She reports that she does not drink alcohol and does not use drugs.     Family History:      Positive as follows:        Problem Relation Age of Onset    Cancer Mother     Heart Disease Father     Asthma Brother     High Blood Pressure Brother     Heart Disease Brother     Arthritis Maternal Grandmother        REVIEW OF SYSTEMS:     Constitutional: ROS: negative for - chills or fever  Head: no headache, no head injury, no migraine   Eyes ROS: denies blurred/double vision  Ears ROS: no hearing difficulty, no tinnitus  Mouth and Throat ROS: no ulceration, dysphagia, dental caries  Psychological ROS: no depression, no anxiety, no panic attacks, denies suicide/homicide ideation  Endocrine ROS: denies polyuria, polydypsia, no heat or cold intolerance  Respiratory ROS: positive for - shortness of breath  Cardiovascular ROS: positive for - chest pain and shortness of breath  Gastrointestinal ROS: no abdominal pain, change in bowel habits, or black or bloody stools  Genito-Urinary ROS: denies dysuria, frequency, urgency; denies hematuria  Musculoskeletal ROS: negative  Neurological ROS: no syncope, no seizures, no numbness or tingling of hands, no numbness or tingling of feet, no paresis  Dermatology: no skin rash, no eczema  Endocrine: no polyuria, polydypsia, no heat/cold intolerance  Hematology: denies bruising easily, denies bleeding problems, denies clotting disorders    PHYSICAL EXAM:    BP (!) 141/76   Pulse 62   Temp 98.1 °F (36.7 °C)   Resp 16   Ht 5' 2\" (1.575 m)   Wt 160 lb (72.6 kg)   SpO2 98%   BMI 29.26 kg/m²     General appearance:  No apparent distress, appears stated age and cooperative. HEENT:  Normal cephalic, atraumatic without obvious deformity. Pupils equal, round, and reactive to light. Conjunctivae/corneas clear. Neck: Supple, with full range of motion. No jugular venous distention. Trachea midline. Respiratory:  Normal respiratory effort. Clear to auscultation, bilaterally without Rales/Wheezes/Rhonchi. Cardiovascular:  Regular rate and rhythm with normal S1/S2 without murmurs, rubs or gallops. Abdomen: Soft, non-tender, non-distended with normal bowel sounds. Musculoskeletal:  No clubbing, cyanosis or edema bilaterally. Full range of motion without deformity. Skin: Skin color, texture, turgor normal.    Neurologic:  Neurovascularly intact without any focal sensory/motor deficits. Cranial nerves: II-XII intact, grossly non-focal.  Psychiatric:  Alert and oriented, thought content appropriate  Capillary Refill: Brisk,< 3 seconds   Peripheral Pulses: +2 palpable, equal bilaterally       Labs:     Recent Labs     10/22/21  0605   WBC 6.7   HGB 12.0   HCT 36.4*        Recent Labs     10/22/21  0605      K 3.6      CO2 27   BUN 18   CREATININE 1.1   CALCIUM 9.0     Recent Labs     10/22/21  0605   AST 40   ALT 45   BILITOT 0.3   ALKPHOS 79       Recent Labs     10/22/21  0605 10/22/21  0849   TROPONINT < 0.010 0.025*     Radiology:     XR CHEST PORTABLE    Result Date: 10/22/2021  PROCEDURE: XR CHEST PORTABLE CLINICAL INFORMATION: CP/SOB .  TECHNIQUE: Portable upright COMPARISON: 12/5/2019 FINDINGS: The heart is borderline in size. Mediastinum is not widened. No infiltrates or effusions. Vascularity is normal. EKG leads overlie the chest.     Borderline heart size no acute cardiopulmonary disease. **This report has been created using voice recognition software. It may contain minor errors which are inherent in voice recognition technology. ** Final report electronically signed by Dr. Carina Flower on 10/22/2021 7:02 AM      EKG:  I have reviewed the EKG with the following interpretation: Sinus rhythm heart rate 60 from EKG at 839 this morning; T wave inversions noted in lead II, 3, aVF, V4 through V6      Thank you Florencio Del Rio MD for the opportunity to be involved in this patient's care.     Electronically signed by FRANCISCA Higuera CNP on 10/22/2021 at 10:37 AM

## 2021-10-23 LAB
ANION GAP SERPL CALCULATED.3IONS-SCNC: 8 MEQ/L (ref 8–16)
BUN BLDV-MCNC: 16 MG/DL (ref 7–22)
CALCIUM SERPL-MCNC: 9.2 MG/DL (ref 8.5–10.5)
CHLORIDE BLD-SCNC: 106 MEQ/L (ref 98–111)
CHOLESTEROL, TOTAL: 154 MG/DL (ref 100–199)
CO2: 26 MEQ/L (ref 23–33)
CREAT SERPL-MCNC: 1 MG/DL (ref 0.4–1.2)
EKG ATRIAL RATE: 55 BPM
EKG P AXIS: 19 DEGREES
EKG P-R INTERVAL: 166 MS
EKG Q-T INTERVAL: 480 MS
EKG QRS DURATION: 82 MS
EKG QTC CALCULATION (BAZETT): 459 MS
EKG R AXIS: 7 DEGREES
EKG T AXIS: -152 DEGREES
EKG VENTRICULAR RATE: 55 BPM
ERYTHROCYTE [DISTWIDTH] IN BLOOD BY AUTOMATED COUNT: 13.6 % (ref 11.5–14.5)
ERYTHROCYTE [DISTWIDTH] IN BLOOD BY AUTOMATED COUNT: 52.3 FL (ref 35–45)
GFR SERPL CREATININE-BSD FRML MDRD: 54 ML/MIN/1.73M2
GLUCOSE BLD-MCNC: 91 MG/DL (ref 70–108)
HCT VFR BLD CALC: 38.1 % (ref 37–47)
HDLC SERPL-MCNC: 56 MG/DL
HEMOGLOBIN: 12.3 GM/DL (ref 12–16)
LDL CHOLESTEROL CALCULATED: 81 MG/DL
MCH RBC QN AUTO: 34.3 PG (ref 26–33)
MCHC RBC AUTO-ENTMCNC: 32.3 GM/DL (ref 32.2–35.5)
MCV RBC AUTO: 106.1 FL (ref 81–99)
PLATELET # BLD: 183 THOU/MM3 (ref 130–400)
PMV BLD AUTO: 10.8 FL (ref 9.4–12.4)
POTASSIUM REFLEX MAGNESIUM: 4.1 MEQ/L (ref 3.5–5.2)
RBC # BLD: 3.59 MILL/MM3 (ref 4.2–5.4)
REASON FOR REJECTION: NORMAL
REJECTED TEST: NORMAL
SODIUM BLD-SCNC: 140 MEQ/L (ref 135–145)
TRIGL SERPL-MCNC: 87 MG/DL (ref 0–199)
WBC # BLD: 5 THOU/MM3 (ref 4.8–10.8)

## 2021-10-23 PROCEDURE — 51798 US URINE CAPACITY MEASURE: CPT

## 2021-10-23 PROCEDURE — 93458 L HRT ARTERY/VENTRICLE ANGIO: CPT | Performed by: INTERNAL MEDICINE

## 2021-10-23 PROCEDURE — 6360000004 HC RX CONTRAST MEDICATION: Performed by: INTERNAL MEDICINE

## 2021-10-23 PROCEDURE — 6360000002 HC RX W HCPCS: Performed by: NURSE PRACTITIONER

## 2021-10-23 PROCEDURE — B2111ZZ FLUOROSCOPY OF MULTIPLE CORONARY ARTERIES USING LOW OSMOLAR CONTRAST: ICD-10-PCS | Performed by: INTERNAL MEDICINE

## 2021-10-23 PROCEDURE — C1894 INTRO/SHEATH, NON-LASER: HCPCS

## 2021-10-23 PROCEDURE — 6370000000 HC RX 637 (ALT 250 FOR IP): Performed by: PHYSICIAN ASSISTANT

## 2021-10-23 PROCEDURE — 6370000000 HC RX 637 (ALT 250 FOR IP): Performed by: NURSE PRACTITIONER

## 2021-10-23 PROCEDURE — 6370000000 HC RX 637 (ALT 250 FOR IP): Performed by: INTERNAL MEDICINE

## 2021-10-23 PROCEDURE — 36415 COLL VENOUS BLD VENIPUNCTURE: CPT

## 2021-10-23 PROCEDURE — 85027 COMPLETE CBC AUTOMATED: CPT

## 2021-10-23 PROCEDURE — 2580000003 HC RX 258: Performed by: INTERNAL MEDICINE

## 2021-10-23 PROCEDURE — 2580000003 HC RX 258: Performed by: NURSE PRACTITIONER

## 2021-10-23 PROCEDURE — 99232 SBSQ HOSP IP/OBS MODERATE 35: CPT | Performed by: PHYSICIAN ASSISTANT

## 2021-10-23 PROCEDURE — 6360000002 HC RX W HCPCS

## 2021-10-23 PROCEDURE — 80061 LIPID PANEL: CPT

## 2021-10-23 PROCEDURE — 1200000003 HC TELEMETRY R&B

## 2021-10-23 PROCEDURE — C1769 GUIDE WIRE: HCPCS

## 2021-10-23 PROCEDURE — 2500000003 HC RX 250 WO HCPCS

## 2021-10-23 PROCEDURE — 93005 ELECTROCARDIOGRAM TRACING: CPT | Performed by: NURSE PRACTITIONER

## 2021-10-23 PROCEDURE — C1760 CLOSURE DEV, VASC: HCPCS

## 2021-10-23 PROCEDURE — 80048 BASIC METABOLIC PNL TOTAL CA: CPT

## 2021-10-23 PROCEDURE — 93010 ELECTROCARDIOGRAM REPORT: CPT | Performed by: NUCLEAR MEDICINE

## 2021-10-23 PROCEDURE — 99232 SBSQ HOSP IP/OBS MODERATE 35: CPT | Performed by: NURSE PRACTITIONER

## 2021-10-23 RX ORDER — SODIUM CHLORIDE 9 MG/ML
25 INJECTION, SOLUTION INTRAVENOUS PRN
Status: CANCELLED | OUTPATIENT
Start: 2021-10-23

## 2021-10-23 RX ORDER — SODIUM CHLORIDE 9 MG/ML
INJECTION, SOLUTION INTRAVENOUS CONTINUOUS
Status: CANCELLED | OUTPATIENT
Start: 2021-10-23

## 2021-10-23 RX ORDER — ATROPINE SULFATE 0.4 MG/ML
0.5 AMPUL (ML) INJECTION
Status: CANCELLED | OUTPATIENT
Start: 2021-10-23 | End: 2021-10-23

## 2021-10-23 RX ORDER — SODIUM CHLORIDE 9 MG/ML
25 INJECTION, SOLUTION INTRAVENOUS PRN
Status: ACTIVE | OUTPATIENT
Start: 2021-10-23 | End: 2021-10-23

## 2021-10-23 RX ORDER — ISOSORBIDE MONONITRATE 30 MG/1
30 TABLET, EXTENDED RELEASE ORAL DAILY
Status: DISCONTINUED | OUTPATIENT
Start: 2021-10-23 | End: 2021-10-24 | Stop reason: HOSPADM

## 2021-10-23 RX ORDER — ACETAMINOPHEN 325 MG/1
650 TABLET ORAL EVERY 4 HOURS PRN
Status: CANCELLED | OUTPATIENT
Start: 2021-10-23

## 2021-10-23 RX ORDER — SODIUM CHLORIDE 0.9 % (FLUSH) 0.9 %
5-40 SYRINGE (ML) INJECTION EVERY 12 HOURS SCHEDULED
Status: CANCELLED | OUTPATIENT
Start: 2021-10-23

## 2021-10-23 RX ORDER — SODIUM CHLORIDE 0.9 % (FLUSH) 0.9 %
5-40 SYRINGE (ML) INJECTION PRN
Status: ACTIVE | OUTPATIENT
Start: 2021-10-23 | End: 2021-10-23

## 2021-10-23 RX ORDER — SODIUM CHLORIDE 9 MG/ML
INJECTION, SOLUTION INTRAVENOUS CONTINUOUS
Status: DISCONTINUED | OUTPATIENT
Start: 2021-10-23 | End: 2021-10-23

## 2021-10-23 RX ORDER — SODIUM CHLORIDE 0.9 % (FLUSH) 0.9 %
5-40 SYRINGE (ML) INJECTION EVERY 12 HOURS SCHEDULED
Status: ACTIVE | OUTPATIENT
Start: 2021-10-23 | End: 2021-10-23

## 2021-10-23 RX ORDER — AMLODIPINE BESYLATE 5 MG/1
5 TABLET ORAL DAILY
Status: DISCONTINUED | OUTPATIENT
Start: 2021-10-23 | End: 2021-10-24 | Stop reason: HOSPADM

## 2021-10-23 RX ORDER — SODIUM CHLORIDE 0.9 % (FLUSH) 0.9 %
5-40 SYRINGE (ML) INJECTION PRN
Status: CANCELLED | OUTPATIENT
Start: 2021-10-23

## 2021-10-23 RX ADMIN — GABAPENTIN 300 MG: 300 CAPSULE ORAL at 22:46

## 2021-10-23 RX ADMIN — HYDROCODONE BITARTRATE AND ACETAMINOPHEN 1 TABLET: 5; 325 TABLET ORAL at 11:11

## 2021-10-23 RX ADMIN — PREDNISONE 1 MG: 1 TABLET ORAL at 18:40

## 2021-10-23 RX ADMIN — GABAPENTIN 300 MG: 300 CAPSULE ORAL at 17:02

## 2021-10-23 RX ADMIN — ALPRAZOLAM 0.5 MG: 0.5 TABLET ORAL at 11:11

## 2021-10-23 RX ADMIN — IOPAMIDOL 40 ML: 755 INJECTION, SOLUTION INTRAVENOUS at 08:50

## 2021-10-23 RX ADMIN — ALPRAZOLAM 0.5 MG: 0.5 TABLET ORAL at 17:02

## 2021-10-23 RX ADMIN — FAMOTIDINE 20 MG: 20 TABLET, FILM COATED ORAL at 11:12

## 2021-10-23 RX ADMIN — ATORVASTATIN CALCIUM 40 MG: 40 TABLET, FILM COATED ORAL at 20:49

## 2021-10-23 RX ADMIN — GABAPENTIN 300 MG: 300 CAPSULE ORAL at 11:12

## 2021-10-23 RX ADMIN — LISINOPRIL 30 MG: 10 TABLET ORAL at 11:11

## 2021-10-23 RX ADMIN — PREDNISONE 1 MG: 1 TABLET ORAL at 11:12

## 2021-10-23 RX ADMIN — HEPARIN SODIUM 12 UNITS/KG/HR: 10000 INJECTION, SOLUTION INTRAVENOUS at 01:46

## 2021-10-23 RX ADMIN — TICAGRELOR 90 MG: 90 TABLET ORAL at 03:00

## 2021-10-23 RX ADMIN — FAMOTIDINE 20 MG: 20 TABLET, FILM COATED ORAL at 20:50

## 2021-10-23 RX ADMIN — ASPIRIN 81 MG CHEWABLE TABLET 81 MG: 81 TABLET CHEWABLE at 07:59

## 2021-10-23 RX ADMIN — FOLIC ACID 1 MG: 1 TABLET ORAL at 11:12

## 2021-10-23 RX ADMIN — METOPROLOL TARTRATE 25 MG: 25 TABLET, FILM COATED ORAL at 20:49

## 2021-10-23 RX ADMIN — SODIUM CHLORIDE, PRESERVATIVE FREE 10 ML: 5 INJECTION INTRAVENOUS at 20:51

## 2021-10-23 RX ADMIN — CETIRIZINE HYDROCHLORIDE 10 MG: 10 TABLET, FILM COATED ORAL at 11:12

## 2021-10-23 RX ADMIN — ISOSORBIDE MONONITRATE 30 MG: 30 TABLET ORAL at 14:34

## 2021-10-23 RX ADMIN — ACETAMINOPHEN 650 MG: 325 TABLET ORAL at 18:40

## 2021-10-23 RX ADMIN — ALPRAZOLAM 0.5 MG: 0.5 TABLET ORAL at 22:46

## 2021-10-23 RX ADMIN — AMLODIPINE BESYLATE 5 MG: 5 TABLET ORAL at 14:34

## 2021-10-23 RX ADMIN — SODIUM CHLORIDE: 9 INJECTION, SOLUTION INTRAVENOUS at 01:46

## 2021-10-23 RX ADMIN — AMITRIPTYLINE HYDROCHLORIDE 10 MG: 10 TABLET, FILM COATED ORAL at 20:49

## 2021-10-23 RX ADMIN — HYDROCODONE BITARTRATE AND ACETAMINOPHEN 1 TABLET: 5; 325 TABLET ORAL at 20:49

## 2021-10-23 ASSESSMENT — PAIN SCALES - GENERAL
PAINLEVEL_OUTOF10: 8
PAINLEVEL_OUTOF10: 5
PAINLEVEL_OUTOF10: 0
PAINLEVEL_OUTOF10: 0
PAINLEVEL_OUTOF10: 8
PAINLEVEL_OUTOF10: 2
PAINLEVEL_OUTOF10: 0
PAINLEVEL_OUTOF10: 6
PAINLEVEL_OUTOF10: 6
PAINLEVEL_OUTOF10: 0

## 2021-10-23 ASSESSMENT — PAIN DESCRIPTION - ONSET: ONSET: GRADUAL

## 2021-10-23 ASSESSMENT — PAIN DESCRIPTION - DESCRIPTORS: DESCRIPTORS: ACHING

## 2021-10-23 ASSESSMENT — PAIN DESCRIPTION - PROGRESSION
CLINICAL_PROGRESSION: NOT CHANGED
CLINICAL_PROGRESSION: NOT CHANGED

## 2021-10-23 ASSESSMENT — PAIN DESCRIPTION - PAIN TYPE: TYPE: CHRONIC PAIN

## 2021-10-23 ASSESSMENT — PAIN DESCRIPTION - LOCATION: LOCATION: BACK

## 2021-10-23 ASSESSMENT — PAIN DESCRIPTION - FREQUENCY: FREQUENCY: INTERMITTENT

## 2021-10-23 NOTE — PROCEDURES
A Bladder scan was performed at 1150 . The patient's last void was at ? [place] Stands The residual amount was measured to be 889 ML. Report of results was given to [facility].

## 2021-10-23 NOTE — PROCEDURES
[address] [place] [address]                            CARDIAC CATHETERIZATION    PATIENT NAME: Paras Avalos                  :        1950  MED REC NO:   [id number]                           ROOM:       0017  ACCOUNT NO:   [de-identified]                           ADMIT DATE: 10/22/2021  PROVIDER:     Christy Brooks MD    DATE OF PROCEDURE:  10/23/2021    INDICATION:  Non-STEMI. DESCRIPTION OF PROCEDURE:  After informed consent was obtained from the  patient, she was brought to the cardiac catheterization laboratory and  prepped in sterile fashion. Right femoral artery was chosen as the  primary point of access. Preprocedure timeout was completed. After  infiltration of the right hand with 2% lidocaine using micropuncture and  modified Seldinger technique under fluoroscopic guidance and ultrasound  guidance, I was able to insert a 6-[place] Slender sheath in the right  femoral artery. Coronary angiography was done with 5-[place] JL4 and  5-[place] JR4 catheters. CORONARY ANGIOGRAM:  LEFT MAIN:  Patent without any significant obstruction. LAD:  Patent without any significant obstruction. Diagonal branches  without any significant obstruction. LCX:  No significant obstruction; gives rise to large OM1 and OM2 system  without any signification obstruction. RCA:  No significant obstruction. Bifurcates in the PDA and the PLV,  both without any significant obstruction. LV:  LVEF is greater than 70%. Left intraventricular outflow tract  obstruction approximately 40 mmHg of systolic pressure gradient. Aortic  valve is tricuspid. No significant aneurysm or dissection in the  visualized portion of the aorta. IMMEDIATE COMPLICATIONS:  None. MEDICATIONS:  See EMR. ACCESS:  Angio-Seal was used for hemostasis. ESTIMATED BLOOD LOSS:  Less than 50 mL.     SUMMARY:  No significant coronary artery disease; preserved LVEF;  intraventricular LV outflow tract obstruction. PLAN:  1. Bedrest.  2.  Optimal medical therapy. 3.  Risk factor management. 4.  Routine access site care. 5.  Avoid nitrates. 6.  Judicious diuresis. 7.  Symptoms are likely related to uncontrolled hypertension, however,  May be associated with outflow tract obstruction, and hyperdynamic LV. 8.  Follow up with primary cardiologist in 2-4 weeks postprocedure. All the above was explained to the patient and the patient's family. They are agreeable and amenable to the plan.         Mitul Anne MD    D: 10/23/2021 9:05:40       T: 10/23/2021 10:40:46     [email or web address]  Job#: [id number]     Doc#: [id number]    CC:

## 2021-10-23 NOTE — BRIEF OP NOTE
[address] [address]  Sedation/Analgesia Post Sedation Record    Pt Name: Jose Bella  Account number: [de-identified]  MRN: [id number]  YOB: 1950  Procedure Performed By: MD MD Marie Griggs, [address]  Primary Care Physician: Zackery Davis MD  Date: 10/23/2021    POST-PROCEDURE    Physicians/Assistants: MD MD Marie Griggs, HANS    Procedure Performed:Cath      Sedation/Anesthesia: Versed/ Fentanyl and 2% xylocaine local anesthesia. Estimated Blood Loss: < 50 ml. Specimens Removed: None         Disposition of Specimen: N/A        Complications: No Immediate Complications.        Post-procedure Diagnosis/Findings:       No CAD  Interventricular systolic gradient 40 mmHg - EF > 70%         MD MD Marie Griggs, HANS  Electronically signed 10/23/2021 at 8:50 AM  Interventional Cardiology

## 2021-10-23 NOTE — PROGRESS NOTES
Hospitalist Progress Note    Patient:  Shaheen Meier      Unit/Bed:8A-17/017-A    YOB: 1950    MRN: [id number]       Acct: [de-identified]     PCP: Jeanna Haq MD    Date of Admission: 10/22/2021    Assessment/Plan:    1. Acute chest pain/possible NSTEMI--on aspirin, Lopressor, statin; on nitroglycerin drip; heparin drip started 0145 today per cardiology request; has been n.p.o. after midnight; greatly appreciate cardiology assistance; planning heart catheterization today;   2. EKG changes--please see #1  3. Essential hypertension, uncontrolled--lisinopril, beta-blocker; monitor  4. Chronic steroid use--1 mg twice a day of prednisone  5. Chronic diastolic heart failure  6.  History of lupus     Expected discharge date:  TBD    Disposition:    [x] Home       [] TCU       [] Rehab       [] Psych       [] SNF       [] [place]       [] Other-    Chief Complaint: Chest pain    Hospital Course:  66 y.o. female who presented to [facility] with chest pain; patient has a past medical history of migraine headaches, lupus along with hypertension; she follows with Dr. Isabelle Salcedo from cardiology; she had a cardiac catheterization done on December 6, 2019 that did not reveal significant coronary artery disease; her cardiogram from December 5, 2019 revealed an EF 60 to 65% with grade 1 diastolic dysfunction; she states this morning approximately 4 AM she developed midsternal chest pressure and states it was heavy in sensation, she denies any radiation, she does relate to shortness of breath, denies nausea, relates to fatigue; she initially rated her chest pressure 8 out of 10 to the mid chest and currently is pain-free; she states she has a history of migraine headaches and states she had a typical migraine headache for the last 4 days along with photophobia however improved today and rates 3 out of 10.     The emergency department, her initial troponin was normal however her second 1 did have mild bump in EKG changes were noted; upon my assessment she is currently chest pain-free and her only complaint is a mild headache rating 3 out of 10; she is being admitted to hospital service for further care and evaluation with a consult to cardiology.     10/23-->hemodynamically stable, family at bedside    Subjective (past 24 hours): Denies chest pain and shortness of breath, states headache resolved, denies nausea    Medications:  Reviewed    Infusion Medications    sodium chloride 75 mL/hr at 10/23/21 0146    sodium chloride      nitroGLYCERIN 5 mcg/min (10/22/21 1043)    sodium chloride      heparin (PORCINE) Infusion 12 Units/kg/hr (10/23/21 0146)     Scheduled Medications    sodium chloride flush  5-40 mL IntraVENous 2 times per day    ALPRAZolam  0.5 mg Oral TID    amitriptyline  10 mg Oral Nightly    aspirin  81 mg Oral Daily    famotidine  20 mg Oral BID    cetirizine  10 mg Oral Daily    folic acid  1 mg Oral Daily    gabapentin  300 mg Oral TID    HYDROcodone-acetaminophen  1 tablet Oral BID    lisinopril  30 mg Oral Daily    metoprolol tartrate  25 mg Oral BID    predniSONE  1 mg Oral BID WC    sodium chloride flush  5-40 mL IntraVENous 2 times per day    atorvastatin  40 mg Oral Nightly    ticagrelor  90 mg Oral BID     PRN Meds: sodium chloride flush, sodium chloride, nitroGLYCERIN, fluticasone, loperamide, promethazine, zolpidem, sodium chloride flush, sodium chloride, ondansetron **OR** ondansetron, acetaminophen **OR** acetaminophen, polyethylene glycol, perflutren lipid microspheres, potassium chloride **OR** potassium alternative oral replacement **OR** potassium chloride, magnesium sulfate, nitroGLYCERIN, heparin (porcine), heparin (porcine)      Intake/Output Summary (Last 24 hours) at 10/23/2021 9496  Last data filed at 10/23/2021 0423  Gross per 24 hour   Intake 213.01 ml   Output 0 ml   Net 213.01 ml       Diet:  Diet NPO  Diet NPO Exceptions are: Sips of Water with Meds    Exam:  /83   Pulse 57   Temp 97.6 °F (36.4 °C) (Oral)   Resp 18   Ht 5' 2\" (1.575 m)   Wt 165 lb 11.2 oz (75.2 kg)   SpO2 94%   BMI 30.31 kg/m²     General appearance: No apparent distress, appears stated age and cooperative. HEENT: Pupils equal, round, and reactive to light. Conjunctivae/corneas clear. Neck: Supple, with full range of motion. No jugular venous distention. Trachea midline. Respiratory:  Normal respiratory effort. Clear to auscultation, bilaterally without Rales/Wheezes/Rhonchi. Cardiovascular: Regular rate and rhythm with normal S1/S2 without murmurs, rubs or gallops. Abdomen: Soft, non-tender, non-distended with normal bowel sounds. Musculoskeletal: passive and active ROM x 4 extremities. Skin: Skin color, texture, turgor normal.  No rashes or lesions. Neurologic:  Neurovascularly intact without any focal sensory/motor deficits. Cranial nerves: II-XII intact, grossly non-focal.  Psychiatric: Alert and oriented, thought content appropriate, normal insight  Capillary Refill: Brisk,< 3 seconds   Peripheral Pulses: +2 palpable, equal bilaterally       Labs:   Recent Labs     10/22/21  0605 10/22/21  1419   WBC 6.7 5.1   HGB 12.0 12.4   HCT 36.4* 37.8    193     Recent Labs     10/22/21  0605      K 3.6      CO2 27   BUN 18   CREATININE 1.1   CALCIUM 9.0     Recent Labs     10/22/21  0605   AST 40   ALT 45   BILITOT 0.3   ALKPHOS 79     Microbiology:    None    Urinalysis:      Lab Results   Component Value Date    NITRU NEGATIVE 12/05/2019    WBCUA 0-2 12/05/2019    BACTERIA NONE SEEN 12/05/2019    RBCUA NONE SEEN 12/05/2019    BLOODU NEGATIVE 12/05/2019    SPECGRAV 1.012 08/12/2017    GLUCOSEU NEGATIVE 12/05/2019       Radiology:  Echo Complete    Result Date: 10/22/2021  Summary  Left ventricle size is normal.  Mild concentric left ventricular hypertrophy. There were no regional wall motion abnormalities.   Ejection fraction is visually estimated in the range of 55% to 60%. Doppler parameters were consistent with abnormal left ventricular  relaxation (grade 1 diastolic dysfunction). Mildly dilated left atrium. Mild aortic regurgitation. Mild mitral regurgitation  Mild tricuspid regurgitation visualized. Right ventricular systolic pressure of 58-51 mmHg consistent with mild  pulmonary hypertension. CTA CHEST W WO CONTRAST    Result Date: 10/22/2021  PROCEDURE: CTA CHEST W WO CONTRAST CLINICAL INFORMATION: chest pain; hx lupus; concerned for PE/dissection per Dr Filipe Danielson COMPARISON: Chest radiograph 10/22/2021 TECHNIQUE: CT angiography of the chest was performed with intravenous contrast. Pulmonary embolus in protocol was used. Multiplanar reformats are provided. All CT scans at this facility use dose modulation, iterative reconstruction, and/or weight based dosing when appropriate to reduce the radiation dose to as low as reasonably achievable. CONTRAST: 80 mL of Isovue-370 FINDINGS: There is adequate opacification of the main pulmonary artery and its branches. . No CT evidence of pulmonary embolus . A bovine aortic arch is seen. Subsegmental atelectasis or scarring is seen in the lung bases, the right middle lobe and lingula. Biapical pleural scarring. Aortic calcifications. No focal pulmonary consolidation. No large pulmonary mass. Central airway is patent. No pleural effusions. No significant pericardial effusion. The heart is at upper limits of normal in size. Ascending thoracic aorta is not dilated. The main pulmonary artery is not dilated. No significantly enlarged mediastinal or  axillary lymph node. The thyroid is not enlarged. No chest wall mass. Limited evaluation below the diaphragm is unremarkable. Bones: Degenerative changes of the thoracic spine. Degenerative changes of the acromioclavicular and glenohumeral joints. .     1. No CT evidence of pulmonary embolus.  2. Subsegmental atelectasis and/or scarring in the lung bases, right middle lobe and lingula. 3. No thoracic aortic dissection. **This report has been created using voice recognition software. It may contain minor errors which are inherent in voice recognition technology. ** Final report electronically signed by Dr Gabriel Regan on 10/22/2021 12:23 PM    XR CHEST PORTABLE    Result Date: 10/22/2021  PROCEDURE: XR CHEST PORTABLE CLINICAL INFORMATION: CP/SOB . TECHNIQUE: Portable upright COMPARISON: 12/5/2019 FINDINGS: The heart is borderline in size. Mediastinum is not widened. No infiltrates or effusions. Vascularity is normal. EKG leads overlie the chest.     Borderline heart size no acute cardiopulmonary disease. **This report has been created using voice recognition software. It may contain minor errors which are inherent in voice recognition technology. ** Final report electronically signed by Dr. Etta Brady on 10/22/2021 7:02 AM      DVT prophylaxis: [] Lovenox                                 [] SCDs                                 [x] Heparin gtt                                 [] Encourage ambulation           [] Already on Anticoagulation     Code Status: Full Code    PT/OT Eval Status: monitor    Tele:   [x] yes sinus rhythm heart rate 60 with inverted T waves             [] no    Active Hospital Problems    Diagnosis Date Noted    Chest pain [R07.9]        Electronically signed by FRANCISCA Champion CNP on 10/23/2021 at 6:08 AM

## 2021-10-23 NOTE — PROGRESS NOTES
Patient returned from cath lab at this time, alert and oriented. Denies pain, drsg to right groin clean dry and intact, no hematoma noted .

## 2021-10-23 NOTE — PROGRESS NOTES
Cardiology Progress Note      Patient:  Jamie Mcintyre  YOB: 1950  MRN: [id number]   Acct: [de-identified]  [facility] Date:  10/22/2021  Primary Cardiologist: Cayla Bonner MD    Not per dr Bong Garza:    CP, mild elevated troponin      CHIEF COMPLAINT:    Chest pain      HISTORY OF PRESENT ILLNESS:    Jamie Mcintyre is a pleasant 66year old female patient with past medical history that includes:   Past Medical History        Past Medical History:   Diagnosis Date    Arthritis      Chronic kidney disease      Displacement of lumbar intervertebral disc      GERD (gastroesophageal reflux disease)      Heart murmur Nov 2012    Hypertension      Irritable bowel syndrome      [facility]upus [address] [address]      Movement disorder      Neuromuscular disorder ([facility] [address]       lupus    Psychiatric problem       depression    Spinal stenosis      Spondylisthesis      Thyroid disease      Vasculitis ([facility] [address] 04/2016     Dr. Rosaura Pearson     Vasculitis [facility]) 2017      The patient presented to [facility] ER with chest pain. Today around 4 AM, she had sudden onset left sided, pressure like/heaviness chest pain, 8/10, non-radiating, now improved with NTG gtt. She was found to have elevated Troponin, started on Lovenox (given one dose). Chest CTA was negative for PE. Troponin was initially normal at <0.01, then trended up to 0.025>0.040>0.043. EKG revealed SR, LVH, deep TWI in inferolateral leads, borderline ST depressions in lateral leads. Cr 1.1. Hgb 12.4. CXR revealed no acute cardiopulmonary disease. Patient denies orthopnea, paroxysmal nocturnal dyspnea, palpitations, dizziness, syncope, recent weight gain or leg swelling. Echocardiogram in 2019 revealed an EF of 60-65%, grade I DD. On 12/2019, patient was admitted to [facility] for chest pain/elevated Troponin, suspected NSTEMI. LHC at that time revealed no obstructive CAD. \"    Subjective (Events in last 24 hours): pt awake and alert. NAD. No cp or sob.   No complaints  Had cath this morning, no intervention  On RA  Did not get morning bp meds yet  On ntg gtt at 5mcg/min  sbp 180 currently    Objective:   BP (!) 146/68   Pulse 58   Temp 98 °F (36.7 °C)   Resp 16   Ht 5' 2\" (1.575 m)   Wt 165 lb 11.2 oz (75.2 kg)   SpO2 97%   BMI 30.31 kg/m²        TELEMETRY: off tele currently    Physical Exam:  General Appearance: alert and oriented to person, place and time, in no acute distress  Cardiovascular: normal rate, regular rhythm, normal S1 and S2, no murmurs, rubs, clicks, or gallops, distal pulses intact, no carotid bruits, no JVD  Pulmonary/Chest: clear to auscultation bilaterally- no wheezes, rales or rhonchi, normal air movement, no respiratory distress  Abdomen: soft, non-tender, non-distended, normal bowel sounds, no masses Extremities: no cyanosis, clubbing or edema, pulse   Skin: warm and dry  Head: normocephalic and atraumatic  Eyes: pupils equal, round, and reactive to light  Neck: supple and non-tender without mass, no thyromegaly   Neurological: alert, oriented, normal speech, no focal findings or movement disorder noted    Medications:    sodium chloride flush  5-40 mL IntraVENous 2 times per day    ALPRAZolam  0.5 mg Oral TID    amitriptyline  10 mg Oral Nightly    aspirin  81 mg Oral Daily    famotidine  20 mg Oral BID    cetirizine  10 mg Oral Daily    folic acid  1 mg Oral Daily    gabapentin  300 mg Oral TID    HYDROcodone-acetaminophen  1 tablet Oral BID    lisinopril  30 mg Oral Daily    metoprolol tartrate  25 mg Oral BID    predniSONE  1 mg Oral BID WC    sodium chloride flush  5-40 mL IntraVENous 2 times per day    atorvastatin  40 mg Oral Nightly    ticagrelor  90 mg Oral BID      sodium chloride 75 mL/hr at 10/23/21 0146    sodium chloride      nitroGLYCERIN 5 mcg/min (10/22/21 1043)    sodium chloride      heparin (PORCINE) Infusion 12 Units/kg/hr (10/23/21 0146)     sodium chloride flush, 5-40 mL, PRN  sodium chloride, 25 mL, PRN  nitroGLYCERIN, 0.4 mg, Q5 Min PRN  fluticasone, 1 spray, Daily PRN  loperamide, 2 mg, 4x Daily PRN  promethazine, 25 mg, Q6H PRN  zolpidem, 10 mg, Nightly PRN  sodium chloride flush, 5-40 mL, PRN  sodium chloride, 25 mL, PRN  ondansetron, 4 mg, Q8H PRN   Or  ondansetron, 4 mg, Q6H PRN  acetaminophen, 650 mg, Q6H PRN   Or  acetaminophen, 650 mg, Q6H PRN  polyethylene glycol, 17 g, Daily PRN  perflutren lipid microspheres, 1.5 mL, ONCE PRN  potassium chloride, 40 mEq, PRN   Or  potassium alternative oral replacement, 40 mEq, PRN   Or  potassium chloride, 10 mEq, PRN  magnesium sulfate, 2,000 mg, PRN  nitroGLYCERIN, 0.4 mg, Q5 Min PRN  heparin (porcine), 2,000 Units, PRN  heparin (porcine), 4,000 Units, PRN        Diagnostics:  TTE  Summary   Left ventricle size is normal.   Mild concentric left ventricular hypertrophy. There were no regional wall motion abnormalities. Ejection fraction is visually estimated in the range of 55% to 60%. Doppler parameters were consistent with abnormal left ventricular   relaxation (grade 1 diastolic dysfunction). Mildly dilated left atrium. Mild aortic regurgitation. Mild mitral regurgitation   Mild tricuspid regurgitation visualized. Right ventricular systolic pressure of 72-62 mmHg consistent with mild   pulmonary hypertension. Signature      ----------------------------------------------------------------   Electronically signed by Pete Restrepo MD (Interpreting   physician) on 10/22/2021 at 05:40 PM    Lab Data:    Cardiac Enzymes:  No results for input(s): CKTOTAL, CKMB, CKMBINDEX, TROPONINI in the last 72 hours.     CBC:   Lab Results   Component Value Date    WBC 5.0 10/23/2021    RBC 3.59 10/23/2021    RBC 4.16 04/18/2012    HGB 12.3 10/23/2021    HCT 38.1 10/23/2021     10/23/2021       CMP:    Lab Results   Component Value Date     10/23/2021    K 4.1 10/23/2021     10/23/2021    CO2 26 10/23/2021    BUN 16 10/23/2021 CREATININE 1.0 10/23/2021    LABGLOM 54 10/23/2021    GLUCOSE 91 10/23/2021    CALCIUM 9.2 10/23/2021       Hepatic Function Panel:    Lab Results   Component Value Date    ALKPHOS 79 10/22/2021    ALT 45 10/22/2021    AST 40 10/22/2021    PROT 5.8 10/22/2021    BILITOT 0.3 10/22/2021    BILIDIR <0.2 12/05/2019    LABALBU 3.8 10/22/2021    LABALBU 3.9 04/14/2012       Magnesium:    Lab Results   Component Value Date    MG 1.7 10/22/2021       PT/INR:    Lab Results   Component Value Date    INR 1.03 12/06/2019       HgBA1c:  No results found for: LABA1C    FLP:    Lab Results   Component Value Date    TRIG 87 10/23/2021    HDL 56 10/23/2021    LDLCALC 81 10/23/2021       TSH:    Lab Results   Component Value Date    TSH 2.280 10/22/2021         Assessment:    Chest pain - resolved, ?vasospastic   Elevated troponins, not NSTEMI per discussion with dr Faraz Lin EKG  S/p cath 10/23/21 - no CAD  Ef 55-65 with grade I DDfx per TTE 10/22/21  Hx SLE  Hx vasculitis   HTN    Plan:    Cont asa//statin/BB/ace  Stop nitro gtt  Add norvasc 5 daily  Add imdur 30 daily  Will see prn  F/up dr ramos 2 weeks         Electronically signed by Radha Clark PA-C on 10/23/2021 at 10:51 AM

## 2021-10-23 NOTE — H&P
[facility]  Sedation/Analgesia History & Physical    Pt Name: Jennie Saunders  Account number: [de-identified]  MRN: [id number]  YOB: 1950  Provider Performing Procedure: Rozina Morales MD MD  Referring Provider: FRANCISCA Bee *   Primary Care Physician: Alfred Zacarias MD  Date: 10/23/2021    PRE-PROCEDURE    Code Status: FULL CODE  Brief History/Pre-Procedure Diagnosis:   NSTEMI        Consent: : I have discussed with the patient risks, benefits, and alternatives (and relevant risks, benefits, and side effects related to alternatives or not receiving care), and likelihood of the success. The patient and/or representative appear to understand and agree to proceed. The discussion encompasses risks, benefits, and side effects related to the alternatives and the risks related to not receiving the proposed care, treatment, and services. The indication, risks and benefits of the procedure and possible therapeutic consequences and alternatives were discussed with the patient. The patient was given the opportunity to ask questions and to have them answered to his/her satisfaction. Risks of the procedure include but are not limited to the following: Bleeding, hematoma including retroperitoneal hemmorhage, infection, pain and discomfort, injury to the aorta and other blood vessels, rhythm disturbance, low blood pressure, myocardial infarction, stroke, kidney damage/failure, myocardial perforation, allergic reactions to sedatives/contrast material, loss of pulse/vascular compromise requiring surgery, aneurysm/pseudoaneurysm formation, possible loss of a limb/hand/leg, needing blood transfusion, requiring emergent open heart surgery or emergent coronary intervention, the need for intubation/respiratory support, the requirement for defibrillation/cardioversion, and death. Alternatives to and omission of the suggested procedure were discussed.  The patient had no further questions and wished to proceed; the consent form was signed. MEDICAL HISTORY  [x]ASHD/ANGINA/MI/CHF   [x]Hypertension  []Diabetes  [x]Hyperlipidemia  []Smoking  []Family Hx of ASHD  [x]Additional information:       has a past medical history of Arthritis, Chronic kidney disease, Displacement of lumbar intervertebral disc, GERD (gastroesophageal reflux disease), Heart murmur, Hypertension, Irritable bowel syndrome, Lupus ([address] Movement disorder, Neuromuscular disorder ([address] Psychiatric problem, Spinal stenosis, Spondylisthesis, Thyroid disease, Vasculitis ([address] and Vasculitis ([address] SURGICAL HISTORY   has a past surgical history that includes Bunionectomy (@ 3862-2730); parathyroidectomy; Hysterectomy (1987); Appendectomy (1987); other surgical history (11/26/2012); Abdomen surgery; back surgery; Colonoscopy; and Endoscopy, colon, diagnostic.   Additional information:       ALLERGIES   Allergies as of 10/22/2021 - Fully Reviewed 10/22/2021   Allergen Reaction Noted    Ciprofloxacin Nausea Only 11/21/2012    Levaquin [levofloxacin] Nausea Only 11/21/2012    Morphine  01/07/2013    Plaquenil [hydroxychloroquine] Rash 11/21/2012    Zofran [ondansetron] Nausea And Vomiting 04/29/2013     Additional information:       MEDICATIONS   Aspirin  [x] 81 mg  [] 325 mg  [] None  Antiplatelet drug therapy use last 5 days  [x]No []Yes  Coumadin Use Last 5 Days [x]No []Yes  Other anticoagulant use last 5 days  [x]No []Yes    Current Facility-Administered Medications:     0.9 % sodium chloride infusion, , IntraVENous, Continuous, Jeanne Tovar MD, Last Rate: 75 mL/hr at 10/23/21 0146, New Bag at 10/23/21 0146    sodium chloride flush 0.9 % injection 5-40 mL, 5-40 mL, IntraVENous, 2 times per day, Edita Camacho MD    sodium chloride flush 0.9 % injection 5-40 mL, 5-40 mL, IntraVENous, PRN, Jeanne Tovar MD    0.9 % sodium chloride infusion, 25 mL, IntraVENous, PRN, Edita Camacho MD    nitroGLYCERIN (NITROSTAT) SL tablet 0.4 mg, 0.4 mg, SubLINGual, Q5 Min PRN, Sarah Rich PA-C, 0.4 mg at 10/22/21 5271    nitroGLYCERIN 50 mg in dextrose 5% 250 mL infusion, 5-200 mcg/min, IntraVENous, Continuous, Sarah Rich PA-C, Last Rate: 1.5 mL/hr at 10/22/21 1043, 5 mcg/min at 10/22/21 1043    ALPRAZolam (XANAX) tablet 0.5 mg, 0.5 mg, Oral, TID, Brannon Hao, APRN - CNP, 0.5 mg at 10/22/21 2258    amitriptyline (ELAVIL) tablet 10 mg, 10 mg, Oral, Nightly, Nohemi Araiza APRN - CNP, 10 mg at 10/22/21 2054    aspirin chewable tablet 81 mg, 81 mg, Oral, Daily, Nohemi Araiza APRN - CNP, 81 mg at 10/22/21 1346    famotidine (PEPCID) tablet 20 mg, 20 mg, Oral, BID, Nohemi Roaan, APRN - CNP, 20 mg at 10/22/21 2101    cetirizine (ZYRTEC) tablet 10 mg, 10 mg, Oral, Daily, Nohemi Roaan, APRN - CNP, 10 mg at 10/22/21 1346    fluticasone (FLONASE) 50 MCG/ACT nasal spray 1 spray, 1 spray, Nasal, Daily PRN, Nohemi Araiza APRN - CNP    folic acid (FOLVITE) tablet 1 mg, 1 mg, Oral, Daily, Nohemi Roaan, APRN - CNP, 1 mg at 10/22/21 1346    gabapentin (NEURONTIN) capsule 300 mg, 300 mg, Oral, TID, Nohemi Roaan, APRN - CNP, 300 mg at 10/22/21 2054    HYDROcodone-acetaminophen (NORCO) 5-325 MG per tablet 1 tablet, 1 tablet, Oral, BID, Nohemi Roaan, APRN - CNP, 1 tablet at 10/22/21 2101    lisinopril (PRINIVIL;ZESTRIL) tablet 30 mg, 30 mg, Oral, Daily, FRANCISCA Mathias - CNP, 30 mg at 10/22/21 1346    loperamide (IMODIUM) capsule 2 mg, 2 mg, Oral, 4x Daily PRN, FRANCISCA Dunn CNP    metoprolol tartrate (LOPRESSOR) tablet 25 mg, 25 mg, Oral, BID, FRANCISCA Mathias - CNP, 25 mg at 10/22/21 2054    predniSONE (DELTASONE) tablet 1 mg, 1 mg, Oral, BID WC, FRANCISCA Mathias - CNP, 1 mg at 10/22/21 1635    promethazine (PHENERGAN) tablet 25 mg, 25 mg, Oral, Q6H PRN, FRANCISCA Mathias CNP    zolpidem (AMBIEN) tablet 10 mg, 10 mg, Oral, Nightly PRN, Nohemi BRENNAN Crispinan, APRN - CNP    sodium chloride flush 0.9 % injection 5-40 mL, 5-40 mL, IntraVENous, 2 times per day, Nohemi Araiza, APRN - CNP    sodium chloride flush 0.9 % injection 5-40 mL, 5-40 mL, IntraVENous, PRN, Nohemi Roaan, APRN - CNP    0.9 % sodium chloride infusion, 25 mL, IntraVENous, PRN, Nohemi Araiza APRN - CNP    ondansetron (ZOFRAN-ODT) disintegrating tablet 4 mg, 4 mg, Oral, Q8H PRN **OR** ondansetron (ZOFRAN) injection 4 mg, 4 mg, IntraVENous, Q6H PRN, Nohemi Araiza APRN - CNP    acetaminophen (TYLENOL) tablet 650 mg, 650 mg, Oral, Q6H PRN **OR** acetaminophen (TYLENOL) suppository 650 mg, 650 mg, Rectal, Q6H PRN, Nohemi Araiza APRN - CNP    polyethylene glycol (GLYCOLAX) packet 17 g, 17 g, Oral, Daily PRN, Nohemi Araiza, APRN - CNP    perflutren lipid microspheres (DEFINITY) injection 1.65 mg, 1.5 mL, IntraVENous, ONCE PRN, Nohemi Araiza APRN - CNP    potassium chloride (KLOR-CON M) extended release tablet 40 mEq, 40 mEq, Oral, PRN **OR** potassium bicarb-citric acid (EFFER-K) effervescent tablet 40 mEq, 40 mEq, Oral, PRN **OR** potassium chloride 10 mEq/100 mL IVPB (Peripheral Line), 10 mEq, IntraVENous, PRN, FRANCISCA Hernandez - CNP    magnesium sulfate 2000 mg in 50 mL IVPB premix, 2,000 mg, IntraVENous, PRN, Nohemi Araiza APRN - CNP    nitroGLYCERIN (NITROSTAT) SL tablet 0.4 mg, 0.4 mg, SubLINGual, Q5 Min PRN, Nohemi Araiza, APRN - CNP    heparin (porcine) injection 2,000 Units, 2,000 Units, IntraVENous, PRN, Nohemi Araiza, APRN - CNP    heparin 25,000 units in dextrose 5% 250 mL (premix) infusion, 5-30 Units/kg/hr, IntraVENous, Continuous, Nohemi Araiza, APRN - CNP, Last Rate: 8.7 mL/hr at 10/23/21 0146, 12 Units/kg/hr at 10/23/21 0146    atorvastatin (LIPITOR) tablet 40 mg, 40 mg, Oral, Nightly, Jeanne Tovar MD, 40 mg at 10/22/21 2054    ticagrelor (BRILINTA) tablet 90 mg, 90 mg, Oral, BID, Henri Diego MD, 90 mg at 10/23/21 0300    heparin (porcine) injection 4,000 Units, 4,000 Units, IntraVENous, PRN, FRANCISCA Champion - CNP  Prior to Admission medications    Medication Sig Start Date End Date Taking? Authorizing Provider   atorvastatin (LIPITOR) 20 MG tablet TAKE 1 TABLET BY MOUTH DAILY 9/29/21  Yes Rober Quesada MD   metoprolol tartrate (LOPRESSOR) 25 MG tablet TAKE 1 TABLET BY MOUTH TWICE DAILY 9/29/21  Yes Rober Quesada MD   lisinopril (PRINIVIL;ZESTRIL) 30 MG tablet TAKE 1 TABLET BY MOUTH EVERY DAY 5/3/21  Yes Tiny More APRN - CNP   nitroGLYCERIN (NITROSTAT) 0.4 MG SL tablet up to max of 3 total doses. If no relief after 1 dose, call 911. 12/6/19  Yes Kelsi Mckenzie MD   gabapentin (NEURONTIN) 300 MG capsule Take 300 mg by mouth 3 times daily. Yes Historical Provider, MD   amitriptyline (ELAVIL) 10 MG tablet Take 10 mg by mouth nightly   Yes Historical Provider, MD   methotrexate (RHEUMATREX) 2.5 MG chemo tablet Take 2.5 mg by mouth once a week Take 5 tablets weekly   Yes Historical Provider, MD   Multiple Vitamins-Minerals (MULTIVITAMIN GUMMIES WOMENS PO) Take by mouth 1 daily   Yes Historical Provider, MD   L-Arginine 500 MG TABS Take 500 mg by mouth daily   Yes Historical Provider, MD   predniSONE (DELTASONE) 1 MG tablet Take 1 mg by mouth 2 times daily    Yes Historical Provider, MD   HYDROcodone-acetaminophen (NORCO) 5-325 MG per tablet Take 1 tablet by mouth 2 times daily. Yes Historical Provider, MD   aspirin 81 MG chewable tablet Take 81 mg by mouth daily   Yes Historical Provider, MD   loperamide (IMODIUM) 2 MG capsule Take 1 capsule by mouth 4 times daily as needed for Diarrhea (may take up to 6 capsules per day if needed.) 12/21/15  Yes Aster Figueroa APRN - CNP   zolpidem (AMBIEN) 10 MG tablet Take 10 mg by mouth nightly as needed for Sleep    Yes Historical Provider, MD   ALPRAZolam (XANAX) 0.5 MG tablet Take 0.5 mg by mouth 3 times daily.     Yes Avel Sepulveda MD   folic acid (FOLVITE) 1 MG tablet Take 1 mg by mouth daily. Yes Historical Provider, MD   fluticasone (CUTIVATE) 0.05 % cream Apply topically 2 times daily as needed Apply topically 2 times daily as needed. Historical Provider, MD   clobetasol (TEMOVATE) 0.05 % cream Apply topically daily as needed Apply topically 2 times daily. Historical Provider, MD   NONFORMULARY Restless leg homeopathic 1 tab as needed    Historical Provider, MD   NONFORMULARY Calcium with VIt D daily    Historical Provider, MD   Methotrexate, PF, (RASUVO) 15 MG/0.3ML chemo injection pen Inject 15 mg into the skin once a week    Historical Provider, MD   fexofenadine (ALLEGRA) 180 MG tablet Take 180 mg by mouth as needed    Historical Provider, MD HAMLINORMULARY Take 250 mg by mouth as needed Acetoimophen-aspirin takes as needed    Historical Provider, MD   promethazine (PHENERGAN) 25 MG tablet Take 25 mg by mouth every 6 hours as needed for Nausea    Historical Provider, MD   Homeopathic Products (ZICAM COLD REMEDY PO) Take 1 tablet by mouth every 3 hours as needed     Historical Provider, MD   fluticasone (FLONASE) 50 MCG/ACT nasal spray 1 spray by Nasal route daily as needed.       Historical Provider, MD     Additional information:       VITAL SIGNS   Vitals:    10/23/21 0423   BP: 131/83   Pulse: 57   Resp: 18   Temp: 97.6 °F (36.4 °C)   SpO2: 94%       PHYSICAL:   General: No acute distress  HEENT:  Unremarkable for age  Neck: without increased JVD, carotid pulses 2+ bilaterally without bruits  Heart: RRR, S1 & S2 WNL, S4 gallop, without murmurs or rubs   NYHA: 2  Lungs: Clear to auscultation    Abdomen: BS present, without HSM, masses, or tenderness    Extremities: without C,C,E.  Pulses 2+ bilaterally  Mental Status: Alert & Oriented        PLANNED PROCEDURE   [x]Cath  [x]PCI                []Pacemaker/AICD  []MARYLU             []Cardioversion []Peripheral angiography/PTA  []Other:      SEDATION  Planned agent: [x]Midazolam []Meperidine [x]Sublimaze []Morphine  []Diazepam  []Other:     ASA Classification:  []1 []2 [x]3 []4 []5  Class 1: A normal healthy patient  Class 2: Pt with mild to moderate systemic disease  Class 3: Severe systemic disease or disturbance  Class 4: Severe systemic disorders that are already life threatening. Class 5: Moribund pt with little chances of survival, for more than 24 hours. Mallampati I Airway Classification:   []1 [x]2 []3 []4    [x]Pre-procedure diagnostic studies complete and results available. Comment:    [x]Previous sedation/anesthesia experiences assessed. Comment:    [x]The patient is an appropriate candidate to undergo the planned procedure sedation and anesthesia. (Refer to nursing sedation/analgesia documentation record)  [x]Formulation and discussion of sedation/procedure plan, risks, and expectations with patient and/or responsible adult completed. [x]Patient examined immediately prior to the procedure.  (Refer to nursing sedation/analgesia documentation record)    Casey Green MD MD   Electronically signed 10/23/2021 at 7:52 AM

## 2021-10-24 ENCOUNTER — APPOINTMENT (OUTPATIENT)
Dept: CT IMAGING | Age: 71
DRG: 287 | End: 2021-10-24
Payer: MEDICARE

## 2021-10-24 VITALS
BODY MASS INDEX: 30.49 KG/M2 | HEIGHT: 62 IN | DIASTOLIC BLOOD PRESSURE: 85 MMHG | WEIGHT: 165.7 LBS | OXYGEN SATURATION: 95 % | RESPIRATION RATE: 20 BRPM | TEMPERATURE: 97 F | HEART RATE: 63 BPM | SYSTOLIC BLOOD PRESSURE: 156 MMHG

## 2021-10-24 LAB
ERYTHROCYTE [DISTWIDTH] IN BLOOD BY AUTOMATED COUNT: 13.6 % (ref 11.5–14.5)
ERYTHROCYTE [DISTWIDTH] IN BLOOD BY AUTOMATED COUNT: 51.4 FL (ref 35–45)
HCT VFR BLD CALC: 34.1 % (ref 37–47)
HEMOGLOBIN: 11.1 GM/DL (ref 12–16)
MCH RBC QN AUTO: 33.5 PG (ref 26–33)
MCHC RBC AUTO-ENTMCNC: 32.6 GM/DL (ref 32.2–35.5)
MCV RBC AUTO: 103 FL (ref 81–99)
PLATELET # BLD: 199 THOU/MM3 (ref 130–400)
PMV BLD AUTO: 10.9 FL (ref 9.4–12.4)
RBC # BLD: 3.31 MILL/MM3 (ref 4.2–5.4)
WBC # BLD: 6 THOU/MM3 (ref 4.8–10.8)

## 2021-10-24 PROCEDURE — 85027 COMPLETE CBC AUTOMATED: CPT

## 2021-10-24 PROCEDURE — 36415 COLL VENOUS BLD VENIPUNCTURE: CPT

## 2021-10-24 PROCEDURE — 6370000000 HC RX 637 (ALT 250 FOR IP): Performed by: PHYSICIAN ASSISTANT

## 2021-10-24 PROCEDURE — 70450 CT HEAD/BRAIN W/O DYE: CPT

## 2021-10-24 PROCEDURE — 6370000000 HC RX 637 (ALT 250 FOR IP): Performed by: NURSE PRACTITIONER

## 2021-10-24 PROCEDURE — 99239 HOSP IP/OBS DSCHRG MGMT >30: CPT | Performed by: NURSE PRACTITIONER

## 2021-10-24 RX ORDER — ISOSORBIDE MONONITRATE 30 MG/1
30 TABLET, EXTENDED RELEASE ORAL DAILY
Qty: 30 TABLET | Refills: 3 | Status: SHIPPED | OUTPATIENT
Start: 2021-10-24 | End: 2022-01-30

## 2021-10-24 RX ORDER — AMLODIPINE BESYLATE 5 MG/1
5 TABLET ORAL DAILY
Qty: 30 TABLET | Refills: 3 | Status: SHIPPED | OUTPATIENT
Start: 2021-10-24

## 2021-10-24 RX ADMIN — AMLODIPINE BESYLATE 5 MG: 5 TABLET ORAL at 09:16

## 2021-10-24 RX ADMIN — FOLIC ACID 1 MG: 1 TABLET ORAL at 09:16

## 2021-10-24 RX ADMIN — ASPIRIN 81 MG CHEWABLE TABLET 81 MG: 81 TABLET CHEWABLE at 09:16

## 2021-10-24 RX ADMIN — FAMOTIDINE 20 MG: 20 TABLET, FILM COATED ORAL at 09:17

## 2021-10-24 RX ADMIN — PREDNISONE 1 MG: 1 TABLET ORAL at 09:16

## 2021-10-24 RX ADMIN — HYDROCODONE BITARTRATE AND ACETAMINOPHEN 1 TABLET: 5; 325 TABLET ORAL at 09:17

## 2021-10-24 RX ADMIN — ISOSORBIDE MONONITRATE 30 MG: 30 TABLET ORAL at 09:16

## 2021-10-24 RX ADMIN — LISINOPRIL 30 MG: 10 TABLET ORAL at 09:16

## 2021-10-24 RX ADMIN — METOPROLOL TARTRATE 25 MG: 25 TABLET, FILM COATED ORAL at 09:16

## 2021-10-24 RX ADMIN — CETIRIZINE HYDROCHLORIDE 10 MG: 10 TABLET, FILM COATED ORAL at 09:16

## 2021-10-24 ASSESSMENT — PAIN SCALES - GENERAL
PAINLEVEL_OUTOF10: 0

## 2021-10-24 NOTE — DISCHARGE SUMMARY
Hospital Medicine Discharge Summary      Patient Identification:   Crystal Duncan   : 89  MRN: [id number]   Account: [de-identified]      Patient's PCP: Josefina Lopez MD    Admit Date: 10/22/2021     Discharge Date:   10/24/2021    Admitting Physician: FRANCISCA Crocker CNP     Discharging Nurse Practitioner: FRANCISCA Crocker CNP     Discharge Diagnoses with Assessment/Plan:  1. Acute chest pain, etiology unknown--on aspirin, Lopressor, statin; greatly appreciate cardiology assistance; CTA of the chest did not reveal pulmonary embolism or thoracic dissection; heart catheterization on 10/23: no significant coronary artery disease, preserved LVEF; was started on Imdur per cardiology, pain has resolved  2. EKG changes--please see #1  3. Essential hypertension, uncontrolled--lisinopril, beta-blocker; Norvasc added per cardiology; patient has a blood pressure monitor at home and encouraged to to monitor and record to show her physicians  4. Chronic steroid use--1 mg twice a day of prednisone  5. Chronic diastolic heart failure--beta-blocker  6. Mild pulmonary hypertension with right ventricular systolic pressure 30 to 35 mmHg  7. VHD with mild AR/mild MR/mild TR  8. History of lupus--follow-up with her physician     The patient was seen and examined on day of discharge and this discharge summary is in conjunction with any daily progress note from day of discharge. Hospital Course:   Crystal Duncan is a 66 y.o. female admitted to [facility] on 10/22/2021 for chest pain; 66 y. o. female who presented to [facility] with chest pain; patient has a past medical history of migraine headaches, lupus along with hypertension; she follows with Dr. Quesada from cardiology; she had a cardiac catheterization done on 2019 that did not reveal significant coronary artery disease; her cardiogram from 2019 revealed an EF 60 to 65% with grade 1 diastolic dysfunction; she states this morning approximately 4 AM she developed midsternal chest pressure and states it was heavy in sensation, she denies any radiation, she does relate to shortness of breath, denies nausea, relates to fatigue; she initially rated her chest pressure 8 out of 10 to the mid chest and currently is pain-free; she states she has a history of migraine headaches and states she had a typical migraine headache for the last 4 days along with photophobia however improved today and rates 3 out of 10.     The emergency department, her initial troponin was normal however her second 1 did have mild bump in EKG changes were noted; upon my assessment she is currently chest pain-free and her only complaint is a mild headache rating 3 out of 10; she is being admitted to hospital service for further care and evaluation with a consult to cardiology.     10/23-->hemodynamically stable, family at bedside    10/24--> had cardiac catheterization yesterday that did not reveal significant CAD and had a preserved EF; patient has been chest pain-free since; does complain of a headache and she does have a history of migraines and also she was started on Imdur; I performed a CT head today that was normal; patient is eating, ambulating and feels very comfortable in going home; she is hemodynamically stable and plan is home today; she was educated on the addition of Imdur and the Norvasc and the fact that Imdur may worsen her headaches, she has a blood pressure cuff at home and is going to monitor her blood pressures and record to show to her physicians; at this time she'll be discharged home in stable condition. .          Exam:     Vitals:  Vitals:    10/23/21 2030 10/24/21 0024 10/24/21 0328 10/24/21 0854   BP: (!) 158/82 (!) 148/81 109/77 (!) 156/85   Pulse: 83 83 59 63   Resp: 16 16 16 20   Temp: 97.8 °F (36.6 °C) 97.6 °F (36.4 °C) 97.4 °F (36.3 °C) 97 °F (36.1 °C)   TempSrc: Oral Oral Oral Oral   SpO2: 95% 95% 95% 95%   Weight:       Height:         Weight: Weight: 165 lb 11.2 oz (75.2 kg)     24 hour intake/output:    Intake/Output Summary (Last 24 hours) at 10/24/2021 0916  Last data filed at 10/24/2021 0024  Gross per 24 hour   Intake 300 ml   Output 1 ml   Net 299 ml         General appearance:  No apparent distress, appears stated age and cooperative. HEENT:  Normal cephalic, atraumatic without obvious deformity. Pupils equal, round, and reactive to light. Conjunctivae/corneas clear. Neck: Supple, with full range of motion. No jugular venous distention. Trachea midline. Respiratory:  Normal respiratory effort. Clear to auscultation, bilaterally without Rales/Wheezes/Rhonchi. Cardiovascular:  Regular rate and rhythm with normal S1/S2 without murmurs, rubs or gallops. Abdomen: Soft, non-tender, non-distended with normal bowel sounds. Obese  Musculoskeletal:  No clubbing, cyanosis or edema bilaterally. Full range of motion without deformity. Skin: Skin color, texture, turgor normal.    Neurologic:  Neurovascularly intact without any focal sensory/motor deficits. Cranial nerves: II-XII intact, grossly non-focal.  Psychiatric:  Alert and oriented, thought content appropriate  Capillary Refill: Brisk,< 3 seconds   Peripheral Pulses: +2 palpable, equal bilaterally       Labs:  For convenience and continuity at follow-up the following most recent labs are provided:      CBC:    Lab Results   Component Value Date    WBC 6.0 10/24/2021    HGB 11.1 10/24/2021    HCT 34.1 10/24/2021     10/24/2021       Renal:    Lab Results   Component Value Date     10/23/2021    K 4.1 10/23/2021     10/23/2021    CO2 26 10/23/2021    BUN 16 10/23/2021    CREATININE 1.0 10/23/2021    CALCIUM 9.2 10/23/2021    PHOS 3.5 06/29/2015       Cardiac:   Recent Labs     10/22/21  0849 10/22/21  1039 10/22/21  1315   TROPONINT 0.025* 0.040* 0.043*       Significant Diagnostic Studies    Radiology:   CT HEAD WO CONTRAST   Final Result    No evidence of acute intracranial abnormality. **This report has been created using voice recognition software. It may contain minor errors which are inherent in voice recognition technology. **      Final report electronically signed by Dr. Caesar Williamson MD on 10/24/2021 8:13 AM      CTA CHEST [address]   Final Result      1. No CT evidence of pulmonary embolus. 2. Subsegmental atelectasis and/or scarring in the lung bases, right middle lobe and lingula. 3. No thoracic aortic dissection. **This report has been created using voice recognition software. It may contain minor errors which are inherent in voice recognition technology. **      Final report electronically signed by Dr Romana Romney on 10/22/2021 12:23 PM      XR CHEST PORTABLE   Final Result   Borderline heart size no acute cardiopulmonary disease. **This report has been created using voice recognition software. It may contain minor errors which are inherent in voice recognition technology. **      Final report electronically signed by Dr. Magda Figueroa on 10/22/2021 7:02 AM        Cardiac catheterization: SUMMARY:  No significant coronary artery disease; preserved LVEF;  intraventricular LV outflow tract obstruction.     Echocardiogram:   Summary   Left ventricle size is normal.   Mild concentric left ventricular hypertrophy. There were no regional wall motion abnormalities. Ejection fraction is visually estimated in the range of 55% to 60%. Doppler parameters were consistent with abnormal left ventricular   relaxation (grade 1 diastolic dysfunction). Mildly dilated left atrium. Mild aortic regurgitation. Mild mitral regurgitation   Mild tricuspid regurgitation visualized. Right ventricular systolic pressure of 21-19 mmHg consistent with mild   pulmonary hypertension.          Consults:     IP CONSULT TO CARDIOLOGY    Disposition:    [x] Home       [] TCU       [] Rehab [] Psych       [] SNF       [] [place]       [] Other-    Condition at Discharge: Stable    Code Status:  Full Code     Pending tests at discharge: none    Patient Instructions:    Discharge lab work: none  Activity: activity as tolerated  Diet: ADULT DIET; Regular; Low Fat/Low Chol/High Fiber/LIT      Follow-up visits:   No follow-up provider specified. Discharge Medications:      Adrian Shelley   Home Medication Instructions BDD:[id number]    Printed on:10/24/21 7651   Medication Information                      ALPRAZolam (XANAX) 0.5 MG tablet  Take 0.5 mg by mouth 3 times daily. amitriptyline (ELAVIL) 10 MG tablet  Take 10 mg by mouth nightly             amLODIPine (NORVASC) 5 MG tablet  Take 1 tablet by mouth daily             aspirin 81 MG chewable tablet  Take 81 mg by mouth daily             atorvastatin (LIPITOR) 20 MG tablet  TAKE 1 TABLET BY MOUTH DAILY             clobetasol (TEMOVATE) 0.05 % cream  Apply topically daily as needed Apply topically 2 times daily. fexofenadine (ALLEGRA) 180 MG tablet  Take 180 mg by mouth as needed             fluticasone (CUTIVATE) 0.05 % cream  Apply topically 2 times daily as needed Apply topically 2 times daily as needed. fluticasone (FLONASE) 50 MCG/ACT nasal spray  1 spray by Nasal route daily as needed. folic acid (FOLVITE) 1 MG tablet  Take 1 mg by mouth daily. gabapentin (NEURONTIN) 300 MG capsule  Take 300 mg by mouth 3 times daily. Homeopathic Products (ZICAM COLD REMEDY PO)  Take 1 tablet by mouth every 3 hours as needed              HYDROcodone-acetaminophen (NORCO) 5-325 MG per tablet  Take 1 tablet by mouth 2 times daily.               isosorbide mononitrate (IMDUR) 30 MG extended release tablet  Take 1 tablet by mouth daily             L-Arginine 500 MG TABS  Take 500 mg by mouth daily             lisinopril (PRINIVIL;ZESTRIL) 30 MG tablet  TAKE 1 TABLET BY MOUTH EVERY DAY             loperamide (IMODIUM) 2 MG capsule  Take 1 capsule by mouth 4 times daily as needed for Diarrhea (may take up to 6 capsules per day if needed.)             methotrexate (RHEUMATREX) 2.5 MG chemo tablet  Take 2.5 mg by mouth once a week Take 5 tablets weekly             Methotrexate, PF, (RASUVO) 15 MG/0.3ML chemo injection pen  Inject 15 mg into the skin once a week             metoprolol tartrate (LOPRESSOR) 25 MG tablet  TAKE 1 TABLET BY MOUTH TWICE DAILY             Multiple Vitamins-Minerals (MULTIVITAMIN GUMMIES WOMENS PO)  Take by mouth 1 daily             nitroGLYCERIN (NITROSTAT) 0.4 MG SL tablet  up to max of 3 total doses. If no relief after 1 dose, call 911. NONFORMULARY  Take 250 mg by mouth as needed Acetoimophen-aspirin takes as needed             NONFORMULARY  Restless leg homeopathic 1 tab as needed             NONFORMULARY  Calcium with VIt D daily             predniSONE (DELTASONE) 1 MG tablet  Take 1 mg by mouth 2 times daily              promethazine (PHENERGAN) 25 MG tablet  Take 25 mg by mouth every 6 hours as needed for Nausea             zolpidem (AMBIEN) 10 MG tablet  Take 10 mg by mouth nightly as needed for Sleep                  Time Spent on discharge is more than 45 minutes in the examination, evaluation, counseling and review of medications and discharge plan. Signed: Thank you Zackery Davis MD for the opportunity to be involved in this patient's care.     Electronically signed by FRANCISCA Burdick CNP on 10/24/2021 at 9:16 AM

## 2021-10-25 ENCOUNTER — CARE COORDINATION (OUTPATIENT)
Dept: CASE MANAGEMENT | Age: 71
End: 2021-10-25

## 2021-10-25 NOTE — CARE COORDINATION
Care Transitions/BPCI Outreach Attempt    Call within 2 business days of discharge: Yes     Attempted to reach patient for transitions of care follow up. 1st attempt to reach for  [facility] discharge call unsuccessful. HIPAA compliant message left requesting call back. CTN to reattempt. Patient: Rocío Eugene Patient : 1950 MRN: [id number]    Last Discharge [address] [id number]       Complaint Diagnosis Description Type Department Provider    10/22/21 Chest Pain; Shortness of Breath Chest pain, unspecified type ED to Hosp-Admission (Discharged) (ADMITTED) [facility] Jeniffer Mtz, APRN - CNP            Was this an external facility discharge? No Discharge Facility: NA    Noted following upcoming appointments from discharge chart review:   [facility] follow up appointment(s): No future appointments.     Caryn Tripp RN [phone]
[phone]

## 2021-10-27 ENCOUNTER — CARE COORDINATION (OUTPATIENT)
Dept: CASE MANAGEMENT | Age: 71
End: 2021-10-27

## 2021-10-27 NOTE — CARE COORDINATION
Care Transitions/BPCI Outreach Attempt    Call within 2 business days of discharge: Yes   Attempted to reach patient for transitions of care follow up. Unable to reach patient. CTN to reattempt    Patient: Martina Cotter Patient : 1950 MRN: [id number]    Last Discharge [facility]       Complaint Diagnosis Description Type Department Provider    10/22/21 Chest Pain;  Shortness of Breath Chest pain, unspecified type ED to Hosp-Admission (Discharged) (ADMITTED) FRANCISCA Coleman - JEANNE Griffin RN CTN

## 2021-10-29 ENCOUNTER — CARE COORDINATION (OUTPATIENT)
Dept: CASE MANAGEMENT | Age: 71
End: 2021-10-29

## 2021-10-29 NOTE — CARE COORDINATION
[facility] Transitions Initial Follow Up Call    Call within 2 business days of discharge: Yes    Patient: Ruth Simons Patient :    MRN: <[id number]>  Reason for Admission:   Discharge Date: 10/24/21 RARS: Readmission Risk Score: 13.1      Last Discharge [facility]       Complaint Diagnosis Description Type Department Provider    10/22/21 Chest Pain; Shortness of Breath Chest pain, unspecified type ED to Hosp-Admission (Discharged) (ADMITTED) [id number] [facility]        10/29/2021 Final  for admission 10/22/21-10/24/21. Patient no longer eligible for BPCI bundle due to excluded DRG. 3rd attempt to contact patient for transition follow up. Unable to reach patient. Left message with contact information and request for call back. Episode to be resolved and CTN to sign off if return call is not received from the patient. Follow Up  No future appointments.     Rolf Mensah RN

## 2022-01-30 ENCOUNTER — HOSPITAL ENCOUNTER (EMERGENCY)
Age: 72
Discharge: HOME | End: 2022-01-30
Payer: MEDICARE

## 2022-01-30 VITALS
OXYGEN SATURATION: 97 % | RESPIRATION RATE: 16 BRPM | TEMPERATURE: 97.6 F | DIASTOLIC BLOOD PRESSURE: 88 MMHG | HEART RATE: 82 BPM | SYSTOLIC BLOOD PRESSURE: 196 MMHG

## 2022-01-30 DIAGNOSIS — N30.01 ACUTE CYSTITIS WITH HEMATURIA: Primary | ICD-10-CM

## 2022-01-30 LAB
BILIRUBIN URINE: NEGATIVE
BLOOD, URINE: ABNORMAL
CHARACTER, URINE: CLEAR
COLOR: YELLOW
GLUCOSE URINE: NEGATIVE MG/DL
KETONES, URINE: NEGATIVE
LEUKOCYTE ESTERASE, URINE: ABNORMAL
NITRITE, URINE: NEGATIVE
PH UA: 6.5 (ref 5–9)
PROTEIN UA: 30 MG/DL
SPECIFIC GRAVITY UA: 1.01 (ref 1–1.03)
UROBILINOGEN, URINE: 0.2 EU/DL (ref 0.2–1)

## 2022-01-30 PROCEDURE — 87186 SC STD MICRODIL/AGAR DIL: CPT

## 2022-01-30 PROCEDURE — 81003 URINALYSIS AUTO W/O SCOPE: CPT

## 2022-01-30 PROCEDURE — 99213 OFFICE O/P EST LOW 20 MIN: CPT

## 2022-01-30 PROCEDURE — 99213 OFFICE O/P EST LOW 20 MIN: CPT | Performed by: NURSE PRACTITIONER

## 2022-01-30 PROCEDURE — 87077 CULTURE AEROBIC IDENTIFY: CPT

## 2022-01-30 PROCEDURE — 87086 URINE CULTURE/COLONY COUNT: CPT

## 2022-01-30 RX ORDER — PANTOPRAZOLE SODIUM 20 MG/1
40 TABLET, DELAYED RELEASE ORAL DAILY
COMMUNITY

## 2022-01-30 RX ORDER — NITROFURANTOIN 25; 75 MG/1; MG/1
100 CAPSULE ORAL 2 TIMES DAILY
Qty: 10 CAPSULE | Refills: 0 | Status: SHIPPED | OUTPATIENT
Start: 2022-01-30 | End: 2022-02-04

## 2022-01-30 ASSESSMENT — ENCOUNTER SYMPTOMS
VOMITING: 0
ABDOMINAL PAIN: 1
NAUSEA: 0
BACK PAIN: 0

## 2022-01-30 ASSESSMENT — PAIN DESCRIPTION - PAIN TYPE: TYPE: ACUTE PAIN

## 2022-01-30 ASSESSMENT — PAIN DESCRIPTION - LOCATION: LOCATION: ABDOMEN

## 2022-01-30 ASSESSMENT — PAIN DESCRIPTION - ORIENTATION: ORIENTATION: LEFT;RIGHT

## 2022-01-30 ASSESSMENT — PAIN SCALES - GENERAL: PAINLEVEL_OUTOF10: 4

## 2022-01-30 ASSESSMENT — PAIN DESCRIPTION - DESCRIPTORS: DESCRIPTORS: CONSTANT;SHARP

## 2022-01-30 ASSESSMENT — PAIN DESCRIPTION - FREQUENCY: FREQUENCY: CONTINUOUS

## 2022-01-30 NOTE — ED PROVIDER NOTES
[facility]       CHIEF COMPLAINT       Chief Complaint   Patient presents with    Hematuria     onset today with urgency        Nurses Notes reviewed and I agree except as noted in the HPI. HISTORY OF PRESENT ILLNESS   Scooter Modi is a 66 y.o. female who presents with complaints of hematuria and urge incontinence. She states this started yesterday. Her symptoms worsened today. She denies any fever or chills. She does admit to lower abdominal pressure and discomfort. She denies trying any treatment. She has tried to increase her fluid intake but continues to have incontinence. She denies any flank pain. The history is provided by the patient. REVIEW OF SYSTEMS     Review of Systems   Constitutional: Negative for chills and fever. Gastrointestinal: Positive for abdominal pain. Negative for nausea and vomiting. Genitourinary: Positive for frequency, hematuria and urgency. Negative for flank pain. Musculoskeletal: Negative for back pain and myalgias. Neurological: Negative for weakness. PAST MEDICAL HISTORY         Diagnosis Date    Arthritis     Chronic kidney disease     Displacement of lumbar intervertebral disc     GERD (gastroesophageal reflux disease)     Heart murmur Nov 2012    Hypertension     Irritable bowel syndrome     Lupus ([address]     Movement disorder     Neuromuscular disorder ([address]     lupus    Psychiatric problem     depression    Spinal stenosis     Spondylisthesis     Thyroid disease     Vasculitis [address] 04/2016    Dr. Augustine Godinez     Vasculitis [facility]) 2017       SURGICALHISTORY     Patient  has a past surgical history that includes Bunionectomy (@ [phone]); parathyroidectomy; Hysterectomy (1987); Appendectomy (1987); other surgical history (11/26/2012); Abdomen surgery; back surgery; Colonoscopy; and Endoscopy, colon, diagnostic.     CURRENT MEDICATIONS       Previous Medications    ALPRAZOLAM John Paul Nunez 0.5 MG TABLET    Take 0.5 mg by mouth 3 times daily. AMITRIPTYLINE (ELAVIL) 10 MG TABLET    Take 10 mg by mouth nightly    AMLODIPINE (NORVASC) 5 MG TABLET    Take 1 tablet by mouth daily    ASPIRIN 81 MG CHEWABLE TABLET    Take 81 mg by mouth daily    ATORVASTATIN (LIPITOR) 20 MG TABLET    TAKE 1 TABLET BY MOUTH DAILY    CLOBETASOL (TEMOVATE) 0.05 % CREAM    Apply topically daily as needed Apply topically 2 times daily. FEXOFENADINE (ALLEGRA) 180 MG TABLET    Take 180 mg by mouth as needed    FLUTICASONE (CUTIVATE) 0.05 % CREAM    Apply topically 2 times daily as needed Apply topically 2 times daily as needed. FLUTICASONE (FLONASE) 50 MCG/ACT NASAL SPRAY    1 spray by Nasal route daily as needed. FOLIC ACID (FOLVITE) 1 MG TABLET    Take 1 mg by mouth daily. GABAPENTIN (NEURONTIN) 300 MG CAPSULE    Take 300 mg by mouth 3 times daily. HOMEOPATHIC PRODUCTS (ZICAM COLD REMEDY PO)    Take 1 tablet by mouth every 3 hours as needed     HYDROCODONE-ACETAMINOPHEN (NORCO) 5-325 MG PER TABLET    Take 1 tablet by mouth 2 times daily. L-ARGININE 500 MG TABS    Take 500 mg by mouth daily    LISINOPRIL (PRINIVIL;ZESTRIL) 30 MG TABLET    TAKE 1 TABLET BY MOUTH EVERY DAY    LOPERAMIDE (IMODIUM) 2 MG CAPSULE    Take 1 capsule by mouth 4 times daily as needed for Diarrhea (may take up to 6 capsules per day if needed.)    METHOTREXATE (RHEUMATREX) 2.5 MG CHEMO TABLET    Take 2.5 mg by mouth once a week Take 5 tablets weekly    METHOTREXATE, PF, (RASUVO) 15 MG/0.3ML CHEMO INJECTION PEN    Inject 15 mg into the skin once a week    METOPROLOL TARTRATE (LOPRESSOR) 25 MG TABLET    TAKE 1 TABLET BY MOUTH TWICE DAILY    MULTIPLE VITAMINS-MINERALS (MULTIVITAMIN GUMMIES WOMENS PO)    Take by mouth 1 daily    NITROGLYCERIN (NITROSTAT) 0.4 MG SL TABLET    up to max of 3 total doses. If no relief after 1 dose, call 911.     NONFORMULARY    Take 250 mg by mouth as needed Acetoimophen-aspirin takes as needed NONFORMULARY    Restless leg homeopathic 1 tab as needed    NONFORMULARY    Calcium with VIt D daily    PANTOPRAZOLE (PROTONIX) 20 MG TABLET    Take 40 mg by mouth daily    PREDNISONE (DELTASONE) 1 MG TABLET    Take 1 mg by mouth 2 times daily     PROMETHAZINE (PHENERGAN) 25 MG TABLET    Take 25 mg by mouth every 6 hours as needed for Nausea    ZOLPIDEM (AMBIEN) 10 MG TABLET    Take 10 mg by mouth nightly as needed for Sleep        ALLERGIES     Patient is is allergic to ciprofloxacin, levaquin [levofloxacin], morphine, plaquenil [hydroxychloroquine], and zofran [ondansetron]. Patients   Immunization History   Administered Date(s) Administered    COVID-19, Moderna, Primary or Immunocompromised, PF, 100mcg/0.5mL 02/10/2021, 03/10/2021    Influenza Virus Vaccine 11/05/2019    PPD Test 11/30/2012    Tdap (Boostrix, Adacel) 03/17/2014       FAMILY HISTORY     Patient's family history includes Arthritis in her maternal grandmother; Asthma in her brother; Cancer in her mother; Heart Disease in her brother and father; High Blood Pressure in her brother. SOCIAL HISTORY     Patient  reports that she has quit smoking. She has a 10.00 pack-year smoking history. She has never used smokeless tobacco. She reports that she does not drink alcohol and does not use drugs. PHYSICAL EXAM     ED TRIAGE VITALS  BP: (!) 196/88, Temp: 97.6 °F (36.4 °C), Pulse: 82, Resp: 16, SpO2: 97 %,Estimated body mass index is 30.31 kg/m² as calculated from the following:    Height as of 10/22/21: 5' 2\" (1.575 m). Weight as of 10/23/21: 165 lb 11.2 oz (75.2 kg). ,No LMP recorded. Patient has had a hysterectomy. Physical Exam  Vitals and nursing note reviewed. Constitutional:       General: She is not in acute distress. Cardiovascular:      Rate and Rhythm: Normal rate and regular rhythm. Heart sounds: Normal heart sounds. Pulmonary:      Effort: Pulmonary effort is normal.      Breath sounds: Normal breath sounds. Abdominal:      General: Abdomen is flat. Bowel sounds are normal.      Palpations: Abdomen is soft. Tenderness: There is abdominal tenderness. There is no right CVA tenderness or left CVA tenderness. Skin:     General: Skin is warm and dry. Neurological:      Mental Status: She is alert and oriented to person, place, and time. DIAGNOSTIC RESULTS     Labs:  Results for orders placed or performed during the hospital encounter of 01/30/22   Urinalysis   Result Value Ref Range    Glucose, Ur Negative NEGATIVE mg/dl    Bilirubin Urine Negative NEGATIVE    Ketones, Urine Negative NEGATIVE    Specific Gravity, UA 1.010 1.002 - 1.030    Blood, Urine Large (A) NEGATIVE    pH, UA 6.50 5.0 - 9.0    Protein, UA 30 (A) NEGATIVE mg/dl    Urobilinogen, Urine 0.20 0.2 - 1.0 eu/dl    Nitrite, Urine Negative NEGATIVE    Leukocyte Esterase, Urine Moderate (A) NEGATIVE    Color, UA Yellow STRAW-YELLOW    Character, Urine Clear CLEAR-SL CLOUD       IMAGING:  None    EKG:  None    URGENT CARE COURSE:     Vitals:    01/30/22 1458   BP: (!) 196/88   Pulse: 82   Resp: 16   Temp: 97.6 °F (36.4 °C)   TempSrc: Temporal   SpO2: 97%       Medications - No data to display       PROCEDURES:  None    FINAL IMPRESSION      1. Acute cystitis with hematuria      DISPOSITION/ PLAN   DISPOSITION Decision To Discharge 01/30/2022 03:37:03 PM     Urinalysis revealed moderate leuks and large blood consistent with UTI. Will treat patient with Quinton Chowdhury. Urine culture sent. Advised may take over-the-counter antipyretics for discomfort. Encourage oral fluid intake. Follow-up with PCP in 5 days if symptoms persist.  Patient's blood pressure is elevated. However, patient has not taken any of her daily medications. Instructed to take as prescribed. Follow-up with PCP.     PATIENT REFERRED TO:  Barbara Cerda MD  [address] / [address] [address]      DISCHARGE MEDICATIONS:  New Prescriptions    NITROFURANTOIN, MACROCRYSTAL-MONOHYDRATE, (MACROBID) 100 MG CAPSULE    Take 1 capsule by mouth 2 times daily for 5 days       Discontinued Medications    ISOSORBIDE MONONITRATE (IMDUR) 30 MG EXTENDED RELEASE TABLET    Take 1 tablet by mouth daily       Current Discharge Medication List          FRANCISCA Weinberg CNP    (Please note that portions of this note were completed with a voice recognition program. Efforts were made to edit the dictations but occasionally words are mis-transcribed.)            FRANCISCA Weinberg CNP  01/30/22 1652

## 2022-01-30 NOTE — ED NOTES
Patient discharge instructions given to pt and pt verbalized understanding, 1 px given, no other needs at this time, and pt left in stable condition.      Alysha Tim RN  01/30/22 5982

## 2022-01-31 LAB
ORGANISM: ABNORMAL
URINE CULTURE, ROUTINE: ABNORMAL

## 2022-08-27 ENCOUNTER — HOSPITAL ENCOUNTER (EMERGENCY)
Age: 72
Discharge: HOME | End: 2022-08-27
Payer: MEDICARE

## 2022-08-27 VITALS
HEIGHT: 62 IN | WEIGHT: 158 LBS | BODY MASS INDEX: 29.08 KG/M2 | OXYGEN SATURATION: 96 % | SYSTOLIC BLOOD PRESSURE: 169 MMHG | DIASTOLIC BLOOD PRESSURE: 83 MMHG | TEMPERATURE: 97.3 F | HEART RATE: 69 BPM | RESPIRATION RATE: 16 BRPM

## 2022-08-27 DIAGNOSIS — N30.01 ACUTE CYSTITIS WITH HEMATURIA: Primary | ICD-10-CM

## 2022-08-27 LAB
BILIRUBIN URINE: NEGATIVE
BLOOD, URINE: ABNORMAL
CHARACTER, URINE: CLEAR
COLOR: YELLOW
GLUCOSE URINE: NEGATIVE MG/DL
KETONES, URINE: NEGATIVE
LEUKOCYTE ESTERASE, URINE: ABNORMAL
NITRITE, URINE: NEGATIVE
PH UA: 6.5 (ref 5–9)
PROTEIN UA: NEGATIVE MG/DL
SPECIFIC GRAVITY UA: <= 1.005 (ref 1–1.03)
UROBILINOGEN, URINE: 0.2 EU/DL (ref 0.2–1)

## 2022-08-27 PROCEDURE — 87077 CULTURE AEROBIC IDENTIFY: CPT

## 2022-08-27 PROCEDURE — 87086 URINE CULTURE/COLONY COUNT: CPT

## 2022-08-27 PROCEDURE — 99213 OFFICE O/P EST LOW 20 MIN: CPT

## 2022-08-27 PROCEDURE — 99213 OFFICE O/P EST LOW 20 MIN: CPT | Performed by: NURSE PRACTITIONER

## 2022-08-27 PROCEDURE — 87186 SC STD MICRODIL/AGAR DIL: CPT

## 2022-08-27 PROCEDURE — 81003 URINALYSIS AUTO W/O SCOPE: CPT

## 2022-08-27 RX ORDER — NITROFURANTOIN 25; 75 MG/1; MG/1
100 CAPSULE ORAL 2 TIMES DAILY
Qty: 10 CAPSULE | Refills: 0 | Status: SHIPPED | OUTPATIENT
Start: 2022-08-27 | End: 2022-09-01

## 2022-08-27 ASSESSMENT — PAIN DESCRIPTION - ONSET: ONSET: ON-GOING

## 2022-08-27 ASSESSMENT — ENCOUNTER SYMPTOMS
NAUSEA: 0
BACK PAIN: 0
ABDOMINAL PAIN: 1
VOMITING: 0

## 2022-08-27 ASSESSMENT — PAIN - FUNCTIONAL ASSESSMENT: PAIN_FUNCTIONAL_ASSESSMENT: 0-10

## 2022-08-27 ASSESSMENT — PAIN DESCRIPTION - FREQUENCY: FREQUENCY: INTERMITTENT

## 2022-08-27 ASSESSMENT — PAIN DESCRIPTION - PAIN TYPE: TYPE: ACUTE PAIN

## 2022-08-27 ASSESSMENT — PAIN DESCRIPTION - DESCRIPTORS: DESCRIPTORS: BURNING

## 2022-08-27 ASSESSMENT — PAIN SCALES - GENERAL: PAINLEVEL_OUTOF10: 3

## 2022-08-27 ASSESSMENT — PAIN DESCRIPTION - LOCATION: LOCATION: VAGINA

## 2022-08-27 NOTE — ED NOTES
Pt presents to [facility] for c/o possible UTI x 1 day.  Pt c/o urinary frequency and dysuria     Birtha Members, LPN  35/40/48 [id number]

## 2022-08-27 NOTE — ED PROVIDER NOTES
[facility]  Urgent Care Encounter       CHIEF COMPLAINT       Chief Complaint   Patient presents with    Urinary Frequency       Nurses Notes reviewed and I agree except as noted in the HPI. HISTORY OF PRESENT ILLNESS   Shaheen Meier is a 67 y.o. female who presents with complaints of urinary frequency, urgency, and dysuria. This is a new problem that started yesterday. Patient had a previous UTI approximately 8 months ago. She admits to decreased oral fluid intake which is likely leading to her infections. She denies any back pain or fevers. She has not tried anything for treatment. The history is provided by the patient. REVIEW OF SYSTEMS     Review of Systems   Constitutional:  Negative for fever. Cardiovascular:  Negative for chest pain. Gastrointestinal:  Positive for abdominal pain. Negative for nausea and vomiting. Genitourinary:  Positive for dysuria, frequency and urgency. Musculoskeletal:  Negative for back pain and myalgias. Neurological:  Negative for weakness. PAST MEDICAL HISTORY         Diagnosis Date    Arthritis     Chronic kidney disease     Displacement of lumbar intervertebral disc     GERD (gastroesophageal reflux disease)     Heart murmur Nov 2012    Hypertension     Irritable bowel syndrome     Lupus (HCC)     Movement disorder     Neuromuscular disorder ([facility]     lupus    Psychiatric problem     depression    Spinal stenosis     Spondylisthesis     Thyroid disease     [facility] 04/2016    Dr. Cristina Clemente     Vasculitis [facility]) 2017       SURGICALHISTORY     Patient  has a past surgical history that includes Bunionectomy (@ 9685-2367); parathyroidectomy; Hysterectomy (1987); Appendectomy (1987); other surgical history (11/26/2012); Abdomen surgery; back surgery; Colonoscopy; and Endoscopy, colon, diagnostic. CURRENT MEDICATIONS       Previous Medications    ALPRAZOLAM (XANAX) 0.5 MG TABLET    Take 0.5 mg by mouth 3 times daily. AMITRIPTYLINE (ELAVIL) 10 MG TABLET    Take 10 mg by mouth nightly    AMLODIPINE (NORVASC) 5 MG TABLET    Take 1 tablet by mouth daily    ASPIRIN 81 MG CHEWABLE TABLET    Take 81 mg by mouth daily    ATORVASTATIN (LIPITOR) 20 MG TABLET    TAKE 1 TABLET BY MOUTH DAILY    CLOBETASOL (TEMOVATE) 0.05 % CREAM    Apply topically daily as needed Apply topically 2 times daily. FEXOFENADINE (ALLEGRA) 180 MG TABLET    Take 180 mg by mouth as needed    FLUTICASONE (CUTIVATE) 0.05 % CREAM    Apply topically 2 times daily as needed Apply topically 2 times daily as needed. FLUTICASONE (FLONASE) 50 MCG/ACT NASAL SPRAY    1 spray by Nasal route daily as needed. FOLIC ACID (FOLVITE) 1 MG TABLET    Take 1 mg by mouth daily. GABAPENTIN (NEURONTIN) 300 MG CAPSULE    Take 300 mg by mouth 3 times daily. HOMEOPATHIC PRODUCTS (ZICAM COLD REMEDY PO)    Take 1 tablet by mouth every 3 hours as needed     HYDROCODONE-ACETAMINOPHEN (NORCO) 5-325 MG PER TABLET    Take 1 tablet by mouth in the morning and 1 tablet in the evening. L-ARGININE 500 MG TABS    Take 500 mg by mouth daily    LISINOPRIL (PRINIVIL;ZESTRIL) 30 MG TABLET    TAKE 1 TABLET BY MOUTH EVERY DAY    LOPERAMIDE (IMODIUM) 2 MG CAPSULE    Take 1 capsule by mouth 4 times daily as needed for Diarrhea (may take up to 6 capsules per day if needed.)    METHOTREXATE (RHEUMATREX) 2.5 MG CHEMO TABLET    Take 2.5 mg by mouth once a week Take 5 tablets weekly    METHOTREXATE, PF, (RASUVO) 15 MG/0.3ML CHEMO INJECTION PEN    Inject 15 mg into the skin once a week    METOPROLOL TARTRATE (LOPRESSOR) 25 MG TABLET    TAKE 1 TABLET BY MOUTH TWICE DAILY    MULTIPLE VITAMINS-MINERALS (MULTIVITAMIN GUMMIES WOMENS PO)    Take by mouth 1 daily    NITROGLYCERIN (NITROSTAT) 0.4 MG SL TABLET    up to max of 3 total doses. If no relief after 1 dose, call 911.     NONFORMULARY    Take 250 mg by mouth as needed Acetoimophen-aspirin takes as needed    NONFORMULARY    Restless leg homeopathic 1 tab as needed    NONFORMULARY    Calcium with VIt D daily    PANTOPRAZOLE (PROTONIX) 20 MG TABLET    Take 40 mg by mouth daily    PREDNISONE (DELTASONE) 1 MG TABLET    Take 1 mg by mouth 2 times daily     PROMETHAZINE (PHENERGAN) 25 MG TABLET    Take 25 mg by mouth every 6 hours as needed for Nausea    ZOLPIDEM (AMBIEN) 10 MG TABLET    Take 10 mg by mouth nightly as needed for Sleep        ALLERGIES     Patient is is allergic to ciprofloxacin, levaquin [levofloxacin], morphine, plaquenil [hydroxychloroquine], and zofran [ondansetron]. Patients   Immunization History   Administered Date(s) Administered    COVID-19, MODERNA BLUE border, Primary or Immunocompromised, (age 12y+), IM, 100 mcg/0.5mL 02/10/2021, 03/10/2021    Influenza Virus Vaccine 11/05/2019    PPD Test 11/30/2012    Tdap (Boostrix, Adacel) 03/17/2014       FAMILY HISTORY     Patient's family history includes Arthritis in her maternal grandmother; Asthma in her brother; Cancer in her mother; Heart Disease in her brother and father; High Blood Pressure in her brother. SOCIAL HISTORY     Patient  reports that she has quit smoking. She has a 10.00 pack-year smoking history. She has never used smokeless tobacco. She reports that she does not drink alcohol and does not use drugs. PHYSICAL EXAM     ED TRIAGE VITALS  BP: (!) 169/83, Temp: 97.3 °F (36.3 °C), Heart Rate: 69, Resp: 16, SpO2: 96 %,Estimated body mass index is 28.9 kg/m² as calculated from the following:    Height as of this encounter: 5' 2\" (1.575 m). Weight as of this encounter: 158 lb (71.7 kg). ,No LMP recorded. Patient has had a hysterectomy. Physical Exam  Vitals and nursing note reviewed. Constitutional:       General: She is not in acute distress. Cardiovascular:      Rate and Rhythm: Normal rate. Pulses: Normal pulses. Pulmonary:      Effort: Pulmonary effort is normal.   Skin:     General: Skin is warm and dry.    Neurological:      Mental Status: She is alert and oriented to person, place, and time. Psychiatric:         Behavior: Behavior normal.       DIAGNOSTIC RESULTS     Labs:  Results for orders placed or performed during the hospital encounter of 08/27/22   Urinalysis   Result Value Ref Range    Glucose, Ur Negative NEGATIVE mg/dl    Bilirubin Urine Negative NEGATIVE    Ketones, Urine Negative NEGATIVE    Specific Gravity, UA <=1.005 1.002 - 1.030    Blood, Urine Moderate (A) NEGATIVE    pH, UA 6.50 5.0 - 9.0    Protein, UA Negative NEGATIVE mg/dl    Urobilinogen, Urine 0.20 0.2 - 1.0 eu/dl    Nitrite, Urine Negative NEGATIVE    Leukocyte Esterase, Urine Large (A) NEGATIVE    Color, UA Yellow STRAW-YELLOW    Character, Urine Clear CLEAR-SL CLOUD       IMAGING:  None    EKG:  None    URGENT CARE COURSE:     Vitals:    08/27/22 1233   BP: (!) 169/83   Pulse: 69   Resp: 16   Temp: 97.3 °F (36.3 °C)   TempSrc: Temporal   SpO2: 96%   Weight: 158 lb (71.7 kg)   Height: 5' 2\" (1.575 m)       Medications - No data to display         PROCEDURES:  None    FINAL IMPRESSION      1. Acute cystitis with hematuria      DISPOSITION/ PLAN   DISPOSITION Decision To Discharge 08/27/2022 01:13:29 PM     Urinalysis indicated moderate blood and large leukocytes. We will send urine for culture. Start patient on Macrobid for the next 5 days. Advised to increase oral fluid intake. Follow-up with PCP as needed.     PATIENT REFERRED TO:  Jd Kwok MD  [address] / [address] [address]      DISCHARGE MEDICATIONS:  New Prescriptions    NITROFURANTOIN, MACROCRYSTAL-MONOHYDRATE, (MACROBID) 100 MG CAPSULE    Take 1 capsule by mouth 2 times daily for 5 days       Discontinued Medications    No medications on file       Current Discharge Medication List          Electa , APRN - CNP    (Please note that portions of this note were completed with a voice recognition program. Efforts were made to edit the dictations but occasionally words are mis-transcribed.)           Gary Resendiz, APRN - CNP  08/27/22 2989

## 2022-08-29 LAB
ORGANISM: ABNORMAL
URINE CULTURE, ROUTINE: ABNORMAL

## 2023-07-07 ENCOUNTER — HOSPITAL ENCOUNTER (EMERGENCY)
Age: 73
Discharge: HOME | End: 2023-07-07
Payer: MEDICARE

## 2023-07-07 VITALS
SYSTOLIC BLOOD PRESSURE: 111 MMHG | RESPIRATION RATE: 18 BRPM | DIASTOLIC BLOOD PRESSURE: 67 MMHG | OXYGEN SATURATION: 98 % | TEMPERATURE: 98.5 F | HEART RATE: 62 BPM

## 2023-07-07 DIAGNOSIS — K13.70 ORAL MUCOSAL LESION: Primary | ICD-10-CM

## 2023-07-07 DIAGNOSIS — Z86.19 HX OF COLD SORES: ICD-10-CM

## 2023-07-07 PROCEDURE — 87252 VIRUS INOCULATION TISSUE: CPT

## 2023-07-07 PROCEDURE — 87070 CULTURE OTHR SPECIMN AEROBIC: CPT

## 2023-07-07 PROCEDURE — 99213 OFFICE O/P EST LOW 20 MIN: CPT

## 2023-07-07 PROCEDURE — 87253 VIRUS INOCULATE TISSUE ADDL: CPT

## 2023-07-07 RX ORDER — ACYCLOVIR 400 MG/1
400 TABLET ORAL 3 TIMES DAILY
Qty: 21 TABLET | Refills: 0 | Status: SHIPPED | OUTPATIENT
Start: 2023-07-07 | End: 2023-07-14

## 2023-07-07 RX ORDER — LIDOCAINE HYDROCHLORIDE 20 MG/ML
10 SOLUTION OROPHARYNGEAL EVERY 4 HOURS PRN
Qty: 100 ML | Refills: 0 | Status: SHIPPED | OUTPATIENT
Start: 2023-07-07

## 2023-07-07 ASSESSMENT — ENCOUNTER SYMPTOMS
ABDOMINAL PAIN: 0
SORE THROAT: 0
VOMITING: 0
COUGH: 0
NAUSEA: 0
SINUS PRESSURE: 0
SHORTNESS OF BREATH: 0
DIARRHEA: 0

## 2023-07-07 NOTE — ED PROVIDER NOTES
[facility] Encounter      [address] [facility]       Chief Complaint   Patient presents with    Mouth Lesions       Nurses Notes reviewed and I agree except as noted in the HPI. HISTORY OF PRESENT ILLNESS   Giselle Hickey is a 67 y.o. female who presents to the urgent care. She presents for evaluation of sores in her mouth that are painful, has been present for 5 days. She states that she was seen at another walk-in care, they prescribed for her Claritin and Mucinex. She continues with pain, making it difficult to eat or drink. She has not had anything like this before but does report cold sores on her lips when she was younger. The patient/patient representative has no other acute complaints at this time. REVIEW OF SYSTEMS     Review of Systems   Constitutional:  Negative for chills, fatigue and fever. HENT:  Positive for mouth sores. Negative for congestion, ear pain, sinus pressure and sore throat. Respiratory:  Negative for cough and shortness of breath. Cardiovascular:  Negative for chest pain. Gastrointestinal:  Negative for abdominal pain, diarrhea, nausea and vomiting. Skin:  Negative for rash. Allergic/Immunologic: Negative for environmental allergies and food allergies. Neurological:  Negative for headaches. Hematological:  Negative for adenopathy.      PAST MEDICAL HISTORY         Diagnosis Date    Arthritis     Chronic kidney disease     Displacement of lumbar intervertebral disc     GERD (gastroesophageal reflux disease)     Heart murmur Nov 2012    Hypertension     Irritable bowel syndrome     Lupus (HCC)     Movement disorder     Neuromuscular disorder ([address])     lupus    Psychiatric problem     depression    Spinal stenosis     Spondylisthesis     Thyroid disease     Vasculitis ([address]) 04/2016    Dr. Veena Corley     Vasculitis [facility]) 2017       SURGICAL HISTORY     Patient  has a past surgical history that includes Bunionectomy (@ [id number]);

## 2023-07-07 NOTE — DISCHARGE INSTRUCTIONS
Patient/patient representative will be contacted upon receipt of final culture and sensitivity. This can take 2-3 days. Any additions or changes to medications or the plan of care will be made at that time.

## 2023-07-09 LAB
BACTERIA THROAT AEROBE CULT: NORMAL
HSV SPEC CULT: NORMAL

## 2023-07-10 LAB — HSV SPEC CULT: NORMAL

## 2023-08-11 ENCOUNTER — HOSPITAL ENCOUNTER (OUTPATIENT)
Age: 73
Discharge: HOME | End: 2023-08-11
Payer: MEDICARE

## 2023-08-11 LAB
ALBUMIN SERPL BCG-MCNC: 4.2 G/DL (ref 3.5–5.1)
ALT SERPL W/O P-5'-P-CCNC: 22 U/L (ref 11–66)
BASOPHILS ABSOLUTE: 0.1 THOU/MM3 (ref 0–0.1)
BASOPHILS NFR BLD AUTO: 1 %
CREAT SERPL-MCNC: 1.3 MG/DL (ref 0.4–1.2)
DEPRECATED RDW RBC AUTO: 51.8 FL (ref 35–45)
EOSINOPHIL NFR BLD AUTO: 2.6 %
EOSINOPHILS ABSOLUTE: 0.2 THOU/MM3 (ref 0–0.4)
ERYTHROCYTE [DISTWIDTH] IN BLOOD BY AUTOMATED COUNT: 14.1 % (ref 11.5–14.5)
ERYTHROCYTE [SEDIMENTATION RATE] IN BLOOD BY WESTERGREN METHOD: 10 MM/HR (ref 0–20)
GFR SERPL CREATININE-BSD FRML MDRD: 43 ML/MIN/1.73M2
HCT VFR BLD AUTO: 40.7 % (ref 37–47)
HGB BLD-MCNC: 13.5 GM/DL (ref 12–16)
IMM GRANULOCYTES # BLD AUTO: 0.02 THOU/MM3 (ref 0–0.07)
IMM GRANULOCYTES NFR BLD AUTO: 0.3 %
LYMPHOCYTES ABSOLUTE: 1.7 THOU/MM3 (ref 1–4.8)
LYMPHOCYTES NFR BLD AUTO: 21.3 %
MCH RBC QN AUTO: 33.5 PG (ref 26–33)
MCHC RBC AUTO-ENTMCNC: 33.2 GM/DL (ref 32.2–35.5)
MCV RBC AUTO: 101 FL (ref 81–99)
MONOCYTES ABSOLUTE: 0.7 THOU/MM3 (ref 0.4–1.3)
MONOCYTES NFR BLD AUTO: 8.6 %
NEUTROPHILS NFR BLD AUTO: 66.2 %
NRBC BLD AUTO-RTO: 0 /100 WBC
PLATELET # BLD AUTO: 251 THOU/MM3 (ref 130–400)
PMV BLD AUTO: 10.9 FL (ref 9.4–12.4)
RBC # BLD AUTO: 4.03 MILL/MM3 (ref 4.2–5.4)
SEGMENTED NEUTROPHILS ABSOLUTE COUNT: 5.2 THOU/MM3 (ref 1.8–7.7)
WBC # BLD AUTO: 7.8 THOU/MM3 (ref 4.8–10.8)

## 2023-08-11 PROCEDURE — 82040 ASSAY OF SERUM ALBUMIN: CPT

## 2023-08-11 PROCEDURE — 85025 COMPLETE CBC W/AUTO DIFF WBC: CPT

## 2023-08-11 PROCEDURE — 82565 ASSAY OF CREATININE: CPT

## 2023-08-11 PROCEDURE — 85651 RBC SED RATE NONAUTOMATED: CPT

## 2023-08-11 PROCEDURE — 36415 COLL VENOUS BLD VENIPUNCTURE: CPT

## 2023-08-11 PROCEDURE — 84460 ALANINE AMINO (ALT) (SGPT): CPT

## 2023-10-20 ENCOUNTER — HOSPITAL ENCOUNTER (OUTPATIENT)
Dept: MRI IMAGING | Age: 73
Discharge: HOME | End: 2023-10-20
Payer: MEDICARE

## 2023-10-20 DIAGNOSIS — M96.1 POST LAMINECTOMY SYNDROME: ICD-10-CM

## 2023-10-20 PROCEDURE — 72148 MRI LUMBAR SPINE W/O DYE: CPT

## 2023-11-03 ENCOUNTER — HOSPITAL ENCOUNTER (OUTPATIENT)
Dept: NUCLEAR MEDICINE | Age: 73
Discharge: HOME | End: 2023-11-03
Attending: INTERNAL MEDICINE
Payer: MEDICARE

## 2023-11-03 ENCOUNTER — HOSPITAL ENCOUNTER (OUTPATIENT)
Dept: ULTRASOUND IMAGING | Age: 73
Discharge: HOME | End: 2023-11-03
Attending: INTERNAL MEDICINE
Payer: MEDICARE

## 2023-11-03 DIAGNOSIS — R10.13 ABDOMINAL PAIN, EPIGASTRIC: ICD-10-CM

## 2023-11-03 DIAGNOSIS — R11.0 NAUSEA: ICD-10-CM

## 2023-11-03 PROCEDURE — 76705 ECHO EXAM OF ABDOMEN: CPT

## 2023-11-03 PROCEDURE — 78227 HEPATOBIL SYST IMAGE W/DRUG: CPT

## 2023-11-03 PROCEDURE — A9537 TC99M MEBROFENIN: HCPCS | Performed by: INTERNAL MEDICINE

## 2023-11-03 PROCEDURE — 3430000000 HC RX DIAGNOSTIC RADIOPHARMACEUTICAL: Performed by: INTERNAL MEDICINE

## 2023-11-03 RX ADMIN — Medication 8.8 MILLICURIE: at 11:30

## 2023-11-26 ENCOUNTER — HOSPITAL ENCOUNTER (EMERGENCY)
Age: 73
Discharge: HOME | End: 2023-11-26
Payer: MEDICARE

## 2023-11-26 VITALS
SYSTOLIC BLOOD PRESSURE: 116 MMHG | RESPIRATION RATE: 16 BRPM | DIASTOLIC BLOOD PRESSURE: 71 MMHG | OXYGEN SATURATION: 96 % | TEMPERATURE: 97.4 F | HEART RATE: 78 BPM

## 2023-11-26 DIAGNOSIS — J01.40 ACUTE NON-RECURRENT PANSINUSITIS: Primary | ICD-10-CM

## 2023-11-26 PROCEDURE — 99213 OFFICE O/P EST LOW 20 MIN: CPT | Performed by: NURSE PRACTITIONER

## 2023-11-26 PROCEDURE — 99213 OFFICE O/P EST LOW 20 MIN: CPT

## 2023-11-26 RX ORDER — DOXYCYCLINE HYCLATE 100 MG
100 TABLET ORAL 2 TIMES DAILY
Qty: 20 TABLET | Refills: 0 | Status: SHIPPED | OUTPATIENT
Start: 2023-11-26 | End: 2023-12-06

## 2023-11-26 ASSESSMENT — ENCOUNTER SYMPTOMS
SINUS PRESSURE: 1
SINUS PAIN: 1

## 2023-11-26 NOTE — ED NOTES
To [facility] with complaints of head pressure, congestion, watery eyes. Symptoms for a week.       Magdalena Waterman, RN  11/26/23 5739

## 2023-12-08 ENCOUNTER — HOSPITAL ENCOUNTER (OUTPATIENT)
Age: 73
Discharge: HOME | End: 2023-12-08
Payer: MEDICARE

## 2023-12-08 ENCOUNTER — HOSPITAL ENCOUNTER (OUTPATIENT)
Dept: GENERAL RADIOLOGY | Age: 73
Discharge: HOME | End: 2023-12-08
Payer: MEDICARE

## 2023-12-08 DIAGNOSIS — Z01.818 PREOP EXAMINATION: ICD-10-CM

## 2023-12-08 LAB
ALBUMIN SERPL BCG-MCNC: 4.3 G/DL (ref 3.5–5.1)
ALP SERPL-CCNC: 83 U/L (ref 38–126)
ALT SERPL W/O P-5'-P-CCNC: 23 U/L (ref 11–66)
ANION GAP SERPL CALC-SCNC: 12 MEQ/L (ref 8–16)
APTT PPP: 29.4 SECONDS (ref 22–38)
AST SERPL-CCNC: 25 U/L (ref 5–40)
BILIRUB SERPL-MCNC: 0.5 MG/DL (ref 0.3–1.2)
BUN SERPL-MCNC: 26 MG/DL (ref 7–22)
CALCIUM SERPL-MCNC: 9.7 MG/DL (ref 8.5–10.5)
CHLORIDE SERPL-SCNC: 100 MEQ/L (ref 98–111)
CO2 SERPL-SCNC: 27 MEQ/L (ref 23–33)
CREAT SERPL-MCNC: 1.4 MG/DL (ref 0.4–1.2)
DEPRECATED RDW RBC AUTO: 49.6 FL (ref 35–45)
ERYTHROCYTE [DISTWIDTH] IN BLOOD BY AUTOMATED COUNT: 13.3 % (ref 11.5–14.5)
GFR SERPL CREATININE-BSD FRML MDRD: 39 ML/MIN/1.73M2
GLUCOSE SERPL-MCNC: 78 MG/DL (ref 70–108)
HCT VFR BLD AUTO: 40 % (ref 37–47)
HGB BLD-MCNC: 12.9 GM/DL (ref 12–16)
INR PPP: 0.89 (ref 0.85–1.13)
MCH RBC QN AUTO: 32.7 PG (ref 26–33)
MCHC RBC AUTO-ENTMCNC: 32.3 GM/DL (ref 32.2–35.5)
MCV RBC AUTO: 101.5 FL (ref 81–99)
PLATELET # BLD AUTO: 252 THOU/MM3 (ref 130–400)
PMV BLD AUTO: 11.6 FL (ref 9.4–12.4)
POTASSIUM SERPL-SCNC: 4 MEQ/L (ref 3.5–5.2)
PROT SERPL-MCNC: 7.1 G/DL (ref 6.1–8)
RBC # BLD AUTO: 3.94 MILL/MM3 (ref 4.2–5.4)
SODIUM SERPL-SCNC: 139 MEQ/L (ref 135–145)
WBC # BLD AUTO: 6.4 THOU/MM3 (ref 4.8–10.8)

## 2023-12-08 PROCEDURE — 71046 X-RAY EXAM CHEST 2 VIEWS: CPT

## 2023-12-08 PROCEDURE — 85610 PROTHROMBIN TIME: CPT

## 2023-12-08 PROCEDURE — 36415 COLL VENOUS BLD VENIPUNCTURE: CPT

## 2023-12-08 PROCEDURE — 93005 ELECTROCARDIOGRAM TRACING: CPT | Performed by: NEUROLOGICAL SURGERY

## 2023-12-08 PROCEDURE — 85027 COMPLETE CBC AUTOMATED: CPT

## 2023-12-08 PROCEDURE — 80053 COMPREHEN METABOLIC PANEL: CPT

## 2023-12-08 PROCEDURE — 85730 THROMBOPLASTIN TIME PARTIAL: CPT

## 2023-12-09 LAB
EKG ATRIAL RATE: 60 BPM
EKG P AXIS: 36 DEGREES
EKG P-R INTERVAL: 164 MS
EKG Q-T INTERVAL: 446 MS
EKG QRS DURATION: 76 MS
EKG QTC CALCULATION (BAZETT): 446 MS
EKG R AXIS: 19 DEGREES
EKG T AXIS: -178 DEGREES
EKG VENTRICULAR RATE: 60 BPM

## 2024-01-05 ENCOUNTER — TELEPHONE (OUTPATIENT)
Dept: SURGERY | Age: 74
End: 2024-01-05

## 2024-01-05 NOTE — TELEPHONE ENCOUNTER
Left voicemail; NP referral from [facility] for abnormal HIDA scan, low ejecting fraction w/ Dr. rBooke

## 2024-02-26 ENCOUNTER — NURSE ONLY (OUTPATIENT)
Dept: LAB | Age: 74
End: 2024-02-26

## 2024-02-26 LAB
ALT SERPL W/O P-5'-P-CCNC: 18 U/L (ref 11–66)
AST SERPL-CCNC: 22 U/L (ref 5–40)
BASOPHILS ABSOLUTE: 0 THOU/MM3 (ref 0–0.1)
BASOPHILS NFR BLD AUTO: 0.6 %
CREAT SERPL-MCNC: 1.4 MG/DL (ref 0.4–1.2)
DEPRECATED RDW RBC AUTO: 49.4 FL (ref 35–45)
EOSINOPHIL NFR BLD AUTO: 2 %
EOSINOPHILS ABSOLUTE: 0.1 THOU/MM3 (ref 0–0.4)
ERYTHROCYTE [DISTWIDTH] IN BLOOD BY AUTOMATED COUNT: 13.7 % (ref 11.5–14.5)
ERYTHROCYTE [SEDIMENTATION RATE] IN BLOOD BY WESTERGREN METHOD: 14 MM/HR (ref 0–20)
GFR SERPL CREATININE-BSD FRML MDRD: 39 ML/MIN/1.73M2
HCT VFR BLD AUTO: 39.1 % (ref 37–47)
HGB BLD-MCNC: 12.7 GM/DL (ref 12–16)
IMM GRANULOCYTES # BLD AUTO: 0.01 THOU/MM3 (ref 0–0.07)
IMM GRANULOCYTES NFR BLD AUTO: 0.2 %
LYMPHOCYTES ABSOLUTE: 1 THOU/MM3 (ref 1–4.8)
LYMPHOCYTES NFR BLD AUTO: 18.9 %
MCH RBC QN AUTO: 33.2 PG (ref 26–33)
MCHC RBC AUTO-ENTMCNC: 32.5 GM/DL (ref 32.2–35.5)
MCV RBC AUTO: 102.4 FL (ref 81–99)
MONOCYTES ABSOLUTE: 0.5 THOU/MM3 (ref 0.4–1.3)
MONOCYTES NFR BLD AUTO: 9.9 %
NEUTROPHILS NFR BLD AUTO: 68.4 %
NRBC BLD AUTO-RTO: 0 /100 WBC
PLATELET # BLD AUTO: 230 THOU/MM3 (ref 130–400)
PMV BLD AUTO: 11.3 FL (ref 9.4–12.4)
RBC # BLD AUTO: 3.82 MILL/MM3 (ref 4.2–5.4)
SEGMENTED NEUTROPHILS ABSOLUTE COUNT: 3.8 THOU/MM3 (ref 1.8–7.7)
WBC # BLD AUTO: 5.5 THOU/MM3 (ref 4.8–10.8)

## 2024-04-30 ENCOUNTER — NURSE ONLY (OUTPATIENT)
Dept: LAB | Age: 74
End: 2024-04-30

## 2024-04-30 LAB
ALT SERPL W/O P-5'-P-CCNC: 34 U/L (ref 11–66)
AST SERPL-CCNC: 30 U/L (ref 5–40)
BACTERIA URNS QL MICRO: ABNORMAL /HPF
BASOPHILS ABSOLUTE: 0 THOU/MM3 (ref 0–0.1)
BASOPHILS NFR BLD AUTO: 0.7 %
BILIRUB UR QL STRIP.AUTO: NEGATIVE
CASTS #/AREA URNS LPF: ABNORMAL /LPF
CASTS 2: ABNORMAL /LPF
CHARACTER UR: CLEAR
COLOR: YELLOW
CREAT SERPL-MCNC: 1.3 MG/DL (ref 0.4–1.2)
CRYSTALS URNS MICRO: ABNORMAL
DEPRECATED RDW RBC AUTO: 51.3 FL (ref 35–45)
EOSINOPHIL NFR BLD AUTO: 2.2 %
EOSINOPHILS ABSOLUTE: 0.1 THOU/MM3 (ref 0–0.4)
EPITHELIAL CELLS, UA: ABNORMAL /HPF
ERYTHROCYTE [DISTWIDTH] IN BLOOD BY AUTOMATED COUNT: 13.8 % (ref 11.5–14.5)
ERYTHROCYTE [SEDIMENTATION RATE] IN BLOOD BY WESTERGREN METHOD: 20 MM/HR (ref 0–20)
GFR SERPL CREATININE-BSD FRML MDRD: 43 ML/MIN/1.73M2
GLUCOSE UR QL STRIP.AUTO: NEGATIVE MG/DL
HCT VFR BLD AUTO: 38 % (ref 37–47)
HGB BLD-MCNC: 12.3 GM/DL (ref 12–16)
HGB UR QL STRIP.AUTO: NEGATIVE
IMM GRANULOCYTES # BLD AUTO: 0.01 THOU/MM3 (ref 0–0.07)
IMM GRANULOCYTES NFR BLD AUTO: 0.2 %
KETONES UR QL STRIP.AUTO: NEGATIVE
LYMPHOCYTES ABSOLUTE: 1.1 THOU/MM3 (ref 1–4.8)
LYMPHOCYTES NFR BLD AUTO: 26.4 %
MCH RBC QN AUTO: 33.8 PG (ref 26–33)
MCHC RBC AUTO-ENTMCNC: 32.4 GM/DL (ref 32.2–35.5)
MCV RBC AUTO: 104.4 FL (ref 81–99)
MISCELLANEOUS 2: ABNORMAL
MONOCYTES ABSOLUTE: 0.1 THOU/MM3 (ref 0.4–1.3)
MONOCYTES NFR BLD AUTO: 3.1 %
NEUTROPHILS NFR BLD AUTO: 67.4 %
NITRITE UR QL STRIP: NEGATIVE
NRBC BLD AUTO-RTO: 1 /100 WBC
PH UR STRIP.AUTO: 5.5 [PH] (ref 5–9)
PLATELET # BLD AUTO: 181 THOU/MM3 (ref 130–400)
PMV BLD AUTO: 11.4 FL (ref 9.4–12.4)
PROT UR STRIP.AUTO-MCNC: NEGATIVE MG/DL
RBC # BLD AUTO: 3.64 MILL/MM3 (ref 4.2–5.4)
RBC URINE: ABNORMAL /HPF
RENAL EPI CELLS #/AREA URNS HPF: ABNORMAL /[HPF]
SEGMENTED NEUTROPHILS ABSOLUTE COUNT: 2.8 THOU/MM3 (ref 1.8–7.7)
SP GR UR REFRACT.AUTO: 1.02 (ref 1–1.03)
UROBILINOGEN, URINE: 0.2 EU/DL (ref 0–1)
WBC # BLD AUTO: 4.2 THOU/MM3 (ref 4.8–10.8)
WBC #/AREA URNS HPF: ABNORMAL /HPF
WBC #/AREA URNS HPF: ABNORMAL /[HPF]
YEAST LIKE FUNGI URNS QL MICRO: ABNORMAL

## 2024-05-01 LAB
BACTERIA UR CULT: ABNORMAL
ORGANISM: ABNORMAL

## 2025-03-05 ENCOUNTER — LAB (OUTPATIENT)
Dept: LAB | Age: 75
End: 2025-03-05

## 2025-03-05 LAB
ALT SERPL W/O P-5'-P-CCNC: 35 U/L (ref 10–35)
AST SERPL-CCNC: 32 U/L (ref 10–35)
BASOPHILS ABSOLUTE: 0 THOU/MM3 (ref 0–0.1)
BASOPHILS NFR BLD AUTO: 0.2 %
CREAT SERPL-MCNC: 1.2 MG/DL (ref 0.5–0.9)
DEPRECATED RDW RBC AUTO: 54.7 FL (ref 35–45)
EOSINOPHIL NFR BLD AUTO: 0.7 %
EOSINOPHILS ABSOLUTE: 0 THOU/MM3 (ref 0–0.4)
ERYTHROCYTE [DISTWIDTH] IN BLOOD BY AUTOMATED COUNT: 15.1 % (ref 11.5–14.5)
ERYTHROCYTE [SEDIMENTATION RATE] IN BLOOD BY WESTERGREN METHOD: 11 MM/HR (ref 0–20)
GFR SERPL CREATININE-BSD FRML MDRD: 47 ML/MIN/1.73M2
HCT VFR BLD AUTO: 38.9 % (ref 37–47)
HGB BLD-MCNC: 13.1 GM/DL (ref 12–16)
IMM GRANULOCYTES # BLD AUTO: 0.01 THOU/MM3 (ref 0–0.07)
IMM GRANULOCYTES NFR BLD AUTO: 0.2 %
LYMPHOCYTES ABSOLUTE: 1.2 THOU/MM3 (ref 1–4.8)
LYMPHOCYTES NFR BLD AUTO: 21.8 %
MCH RBC QN AUTO: 34 PG (ref 26–33)
MCHC RBC AUTO-ENTMCNC: 33.7 GM/DL (ref 32.2–35.5)
MCV RBC AUTO: 101 FL (ref 81–99)
MONOCYTES ABSOLUTE: 0.3 THOU/MM3 (ref 0.4–1.3)
MONOCYTES NFR BLD AUTO: 6.3 %
NEUTROPHILS ABSOLUTE: 3.8 THOU/MM3 (ref 1.8–7.7)
NEUTROPHILS NFR BLD AUTO: 70.8 %
NRBC BLD AUTO-RTO: 0 /100 WBC
PLATELET # BLD AUTO: 221 THOU/MM3 (ref 130–400)
PMV BLD AUTO: 11.9 FL (ref 9.4–12.4)
RBC # BLD AUTO: 3.85 MILL/MM3 (ref 4.2–5.4)
WBC # BLD AUTO: 5.4 THOU/MM3 (ref 4.8–10.8)

## 2025-05-02 ENCOUNTER — LAB (OUTPATIENT)
Dept: LAB | Age: 75
End: 2025-05-02

## 2025-05-02 LAB
ALT SERPL W/O P-5'-P-CCNC: 22 U/L (ref 10–35)
AST SERPL-CCNC: 25 U/L (ref 10–35)
BASOPHILS ABSOLUTE: 0.1 THOU/MM3 (ref 0–0.1)
BASOPHILS NFR BLD AUTO: 0.7 %
CREAT SERPL-MCNC: 1.2 MG/DL (ref 0.5–0.9)
DEPRECATED RDW RBC AUTO: 50.4 FL (ref 35–45)
EOSINOPHIL NFR BLD AUTO: 2.5 %
EOSINOPHILS ABSOLUTE: 0.2 THOU/MM3 (ref 0–0.4)
ERYTHROCYTE [DISTWIDTH] IN BLOOD BY AUTOMATED COUNT: 13.5 % (ref 11.5–14.5)
ERYTHROCYTE [SEDIMENTATION RATE] IN BLOOD BY WESTERGREN METHOD: 16 MM/HR (ref 0–20)
GFR SERPL CREATININE-BSD FRML MDRD: 47 ML/MIN/1.73M2
HCT VFR BLD AUTO: 39.5 % (ref 37–47)
HGB BLD-MCNC: 13 GM/DL (ref 12–16)
IMM GRANULOCYTES # BLD AUTO: 0.02 THOU/MM3 (ref 0–0.07)
IMM GRANULOCYTES NFR BLD AUTO: 0.3 %
LYMPHOCYTES ABSOLUTE: 1.5 THOU/MM3 (ref 1–4.8)
LYMPHOCYTES NFR BLD AUTO: 19.5 %
MCH RBC QN AUTO: 33.9 PG (ref 26–33)
MCHC RBC AUTO-ENTMCNC: 32.9 GM/DL (ref 32.2–35.5)
MCV RBC AUTO: 102.9 FL (ref 81–99)
MONOCYTES ABSOLUTE: 0.9 THOU/MM3 (ref 0.4–1.3)
MONOCYTES NFR BLD AUTO: 11.3 %
NEUTROPHILS ABSOLUTE: 5 THOU/MM3 (ref 1.8–7.7)
NEUTROPHILS NFR BLD AUTO: 65.7 %
NRBC BLD AUTO-RTO: 0 /100 WBC
PLATELET # BLD AUTO: 252 THOU/MM3 (ref 130–400)
PMV BLD AUTO: 11.7 FL (ref 9.4–12.4)
RBC # BLD AUTO: 3.84 MILL/MM3 (ref 4.2–5.4)
WBC # BLD AUTO: 7.6 THOU/MM3 (ref 4.8–10.8)

## 2025-07-24 ENCOUNTER — LAB (OUTPATIENT)
Dept: LAB | Age: 75
End: 2025-07-24

## 2025-07-24 LAB
ALT SERPL W/O P-5'-P-CCNC: 37 U/L (ref 10–35)
AST SERPL-CCNC: 33 U/L (ref 10–35)
BASOPHILS ABSOLUTE: 0 THOU/MM3 (ref 0–0.1)
BASOPHILS NFR BLD AUTO: 0.4 %
CREAT SERPL-MCNC: 1.2 MG/DL (ref 0.5–0.9)
DEPRECATED RDW RBC AUTO: 52 FL (ref 35–45)
EOSINOPHIL NFR BLD AUTO: 2.1 %
EOSINOPHILS ABSOLUTE: 0.1 THOU/MM3 (ref 0–0.4)
ERYTHROCYTE [DISTWIDTH] IN BLOOD BY AUTOMATED COUNT: 14 % (ref 11.5–14.5)
ERYTHROCYTE [SEDIMENTATION RATE] IN BLOOD BY WESTERGREN METHOD: 5 MM/HR (ref 0–20)
GFR SERPL CREATININE-BSD FRML MDRD: 47 ML/MIN/1.73M2
HCT VFR BLD AUTO: 38.8 % (ref 37–47)
HGB BLD-MCNC: 12.5 GM/DL (ref 12–16)
IMM GRANULOCYTES # BLD AUTO: 0.01 THOU/MM3 (ref 0–0.07)
IMM GRANULOCYTES NFR BLD AUTO: 0.1 %
LYMPHOCYTES ABSOLUTE: 1.3 THOU/MM3 (ref 1–4.8)
LYMPHOCYTES NFR BLD AUTO: 17.8 %
MCH RBC QN AUTO: 32.9 PG (ref 26–33)
MCHC RBC AUTO-ENTMCNC: 32.2 GM/DL (ref 32.2–35.5)
MCV RBC AUTO: 102.1 FL (ref 81–99)
MONOCYTES ABSOLUTE: 0.9 THOU/MM3 (ref 0.4–1.3)
MONOCYTES NFR BLD AUTO: 12.8 %
NEUTROPHILS ABSOLUTE: 4.7 THOU/MM3 (ref 1.8–7.7)
NEUTROPHILS NFR BLD AUTO: 66.8 %
NRBC BLD AUTO-RTO: 0 /100 WBC
PLATELET # BLD AUTO: 237 THOU/MM3 (ref 130–400)
PMV BLD AUTO: 11.9 FL (ref 9.4–12.4)
RBC # BLD AUTO: 3.8 MILL/MM3 (ref 4.2–5.4)
WBC # BLD AUTO: 7.1 THOU/MM3 (ref 4.8–10.8)

## 2025-08-06 ENCOUNTER — LAB (OUTPATIENT)
Dept: LAB | Age: 75
End: 2025-08-06

## 2025-08-06 LAB
ALT SERPL W/O P-5'-P-CCNC: 35 U/L (ref 10–35)
CREAT SERPL-MCNC: 1.2 MG/DL (ref 0.5–0.9)
GFR SERPL CREATININE-BSD FRML MDRD: 47 ML/MIN/1.73M2